# Patient Record
Sex: FEMALE | Race: WHITE | NOT HISPANIC OR LATINO | Employment: OTHER | URBAN - METROPOLITAN AREA
[De-identification: names, ages, dates, MRNs, and addresses within clinical notes are randomized per-mention and may not be internally consistent; named-entity substitution may affect disease eponyms.]

---

## 2021-04-13 DIAGNOSIS — Z23 ENCOUNTER FOR IMMUNIZATION: ICD-10-CM

## 2024-07-29 ENCOUNTER — HOSPITAL ENCOUNTER (INPATIENT)
Facility: HOSPITAL | Age: 69
LOS: 2 days | Discharge: HOME | DRG: 683 | End: 2024-08-01
Attending: EMERGENCY MEDICINE | Admitting: INTERNAL MEDICINE
Payer: COMMERCIAL

## 2024-07-29 DIAGNOSIS — D50.9 IRON DEFICIENCY ANEMIA: ICD-10-CM

## 2024-07-29 DIAGNOSIS — N17.9 AKI (ACUTE KIDNEY INJURY) (HCC): Primary | ICD-10-CM

## 2024-07-29 DIAGNOSIS — I10 ESSENTIAL HYPERTENSION: ICD-10-CM

## 2024-07-29 DIAGNOSIS — E11.9 TYPE 2 DIABETES MELLITUS (HCC): ICD-10-CM

## 2024-07-29 DIAGNOSIS — D64.9 ANEMIA: ICD-10-CM

## 2024-07-29 LAB
ALBUMIN SERPL BCG-MCNC: 3.9 G/DL (ref 3.5–5)
ALP SERPL-CCNC: 42 U/L (ref 34–104)
ALT SERPL W P-5'-P-CCNC: 31 U/L (ref 7–52)
ANION GAP SERPL CALCULATED.3IONS-SCNC: 13 MMOL/L (ref 4–13)
APTT PPP: 33 SECONDS (ref 23–37)
AST SERPL W P-5'-P-CCNC: 41 U/L (ref 13–39)
BASOPHILS # BLD AUTO: 0.07 THOUSANDS/ÂΜL (ref 0–0.1)
BASOPHILS NFR BLD AUTO: 1 % (ref 0–1)
BILIRUB SERPL-MCNC: 0.77 MG/DL (ref 0.2–1)
BUN SERPL-MCNC: 104 MG/DL (ref 5–25)
CALCIUM SERPL-MCNC: 9.5 MG/DL (ref 8.4–10.2)
CARDIAC TROPONIN I PNL SERPL HS: 15 NG/L
CHLORIDE SERPL-SCNC: 96 MMOL/L (ref 96–108)
CO2 SERPL-SCNC: 23 MMOL/L (ref 21–32)
CREAT SERPL-MCNC: 3.73 MG/DL (ref 0.6–1.3)
EOSINOPHIL # BLD AUTO: 0.31 THOUSAND/ÂΜL (ref 0–0.61)
EOSINOPHIL NFR BLD AUTO: 4 % (ref 0–6)
ERYTHROCYTE [DISTWIDTH] IN BLOOD BY AUTOMATED COUNT: 19.2 % (ref 11.6–15.1)
GFR SERPL CREATININE-BSD FRML MDRD: 11 ML/MIN/1.73SQ M
GLUCOSE SERPL-MCNC: 135 MG/DL (ref 65–140)
HCT VFR BLD AUTO: 33 % (ref 34.8–46.1)
HGB BLD-MCNC: 9.8 G/DL (ref 11.5–15.4)
IMM GRANULOCYTES # BLD AUTO: 0.03 THOUSAND/UL (ref 0–0.2)
IMM GRANULOCYTES NFR BLD AUTO: 0 % (ref 0–2)
INR PPP: 1.22 (ref 0.84–1.19)
LYMPHOCYTES # BLD AUTO: 2.26 THOUSANDS/ÂΜL (ref 0.6–4.47)
LYMPHOCYTES NFR BLD AUTO: 31 % (ref 14–44)
MCH RBC QN AUTO: 24.1 PG (ref 26.8–34.3)
MCHC RBC AUTO-ENTMCNC: 29.7 G/DL (ref 31.4–37.4)
MCV RBC AUTO: 81 FL (ref 82–98)
MONOCYTES # BLD AUTO: 0.66 THOUSAND/ÂΜL (ref 0.17–1.22)
MONOCYTES NFR BLD AUTO: 9 % (ref 4–12)
NEUTROPHILS # BLD AUTO: 4.07 THOUSANDS/ÂΜL (ref 1.85–7.62)
NEUTS SEG NFR BLD AUTO: 55 % (ref 43–75)
NRBC BLD AUTO-RTO: 0 /100 WBCS
PLATELET # BLD AUTO: 270 THOUSANDS/UL (ref 149–390)
PMV BLD AUTO: 10.9 FL (ref 8.9–12.7)
POTASSIUM SERPL-SCNC: 3.3 MMOL/L (ref 3.5–5.3)
PROT SERPL-MCNC: 8 G/DL (ref 6.4–8.4)
PROTHROMBIN TIME: 15.9 SECONDS (ref 11.6–14.5)
RBC # BLD AUTO: 4.07 MILLION/UL (ref 3.81–5.12)
SODIUM SERPL-SCNC: 132 MMOL/L (ref 135–147)
WBC # BLD AUTO: 7.4 THOUSAND/UL (ref 4.31–10.16)

## 2024-07-29 PROCEDURE — 80053 COMPREHEN METABOLIC PANEL: CPT | Performed by: EMERGENCY MEDICINE

## 2024-07-29 PROCEDURE — 93005 ELECTROCARDIOGRAM TRACING: CPT

## 2024-07-29 PROCEDURE — 86850 RBC ANTIBODY SCREEN: CPT | Performed by: EMERGENCY MEDICINE

## 2024-07-29 PROCEDURE — 84484 ASSAY OF TROPONIN QUANT: CPT | Performed by: EMERGENCY MEDICINE

## 2024-07-29 PROCEDURE — 86901 BLOOD TYPING SEROLOGIC RH(D): CPT | Performed by: EMERGENCY MEDICINE

## 2024-07-29 PROCEDURE — 82550 ASSAY OF CK (CPK): CPT | Performed by: EMERGENCY MEDICINE

## 2024-07-29 PROCEDURE — 96361 HYDRATE IV INFUSION ADD-ON: CPT

## 2024-07-29 PROCEDURE — 99285 EMERGENCY DEPT VISIT HI MDM: CPT | Performed by: EMERGENCY MEDICINE

## 2024-07-29 PROCEDURE — 83036 HEMOGLOBIN GLYCOSYLATED A1C: CPT | Performed by: INTERNAL MEDICINE

## 2024-07-29 PROCEDURE — 85610 PROTHROMBIN TIME: CPT | Performed by: EMERGENCY MEDICINE

## 2024-07-29 PROCEDURE — 99284 EMERGENCY DEPT VISIT MOD MDM: CPT

## 2024-07-29 PROCEDURE — 85025 COMPLETE CBC W/AUTO DIFF WBC: CPT | Performed by: EMERGENCY MEDICINE

## 2024-07-29 PROCEDURE — 85730 THROMBOPLASTIN TIME PARTIAL: CPT | Performed by: EMERGENCY MEDICINE

## 2024-07-29 PROCEDURE — 96374 THER/PROPH/DIAG INJ IV PUSH: CPT

## 2024-07-29 PROCEDURE — 36415 COLL VENOUS BLD VENIPUNCTURE: CPT | Performed by: EMERGENCY MEDICINE

## 2024-07-29 PROCEDURE — 86900 BLOOD TYPING SEROLOGIC ABO: CPT | Performed by: EMERGENCY MEDICINE

## 2024-07-29 PROCEDURE — 1124F ACP DISCUSS-NO DSCNMKR DOCD: CPT | Performed by: EMERGENCY MEDICINE

## 2024-07-29 RX ORDER — PANTOPRAZOLE SODIUM 40 MG/10ML
40 INJECTION, POWDER, LYOPHILIZED, FOR SOLUTION INTRAVENOUS ONCE
Status: COMPLETED | OUTPATIENT
Start: 2024-07-29 | End: 2024-07-29

## 2024-07-29 RX ADMIN — PANTOPRAZOLE SODIUM 40 MG: 40 INJECTION, POWDER, FOR SOLUTION INTRAVENOUS at 22:57

## 2024-07-29 RX ADMIN — SODIUM CHLORIDE 1000 ML: 0.9 INJECTION, SOLUTION INTRAVENOUS at 22:57

## 2024-07-30 ENCOUNTER — APPOINTMENT (INPATIENT)
Dept: RADIOLOGY | Facility: HOSPITAL | Age: 69
DRG: 683 | End: 2024-07-30
Payer: COMMERCIAL

## 2024-07-30 ENCOUNTER — APPOINTMENT (INPATIENT)
Dept: NON INVASIVE DIAGNOSTICS | Facility: HOSPITAL | Age: 69
DRG: 683 | End: 2024-07-30
Payer: COMMERCIAL

## 2024-07-30 PROBLEM — I10 ESSENTIAL HYPERTENSION: Status: ACTIVE | Noted: 2024-07-30

## 2024-07-30 PROBLEM — E66.9 OBESITY (BMI 30-39.9): Status: ACTIVE | Noted: 2024-07-30

## 2024-07-30 PROBLEM — D64.9 ANEMIA: Status: ACTIVE | Noted: 2024-07-30

## 2024-07-30 PROBLEM — Z87.19 H/O: UPPER GI BLEED: Status: ACTIVE | Noted: 2024-07-30

## 2024-07-30 PROBLEM — I50.32 CHRONIC DIASTOLIC CONGESTIVE HEART FAILURE (HCC): Status: ACTIVE | Noted: 2024-07-30

## 2024-07-30 PROBLEM — N17.9 AKI (ACUTE KIDNEY INJURY) (HCC): Status: ACTIVE | Noted: 2024-07-30

## 2024-07-30 PROBLEM — E11.9 TYPE 2 DIABETES MELLITUS (HCC): Status: ACTIVE | Noted: 2024-07-30

## 2024-07-30 LAB
2HR DELTA HS TROPONIN: 0 NG/L
ABO GROUP BLD: NORMAL
ANION GAP SERPL CALCULATED.3IONS-SCNC: 10 MMOL/L (ref 4–13)
AORTIC ROOT: 2.9 CM
APICAL FOUR CHAMBER EJECTION FRACTION: 77 %
ASCENDING AORTA: 3.2 CM
ATRIAL RATE: 82 BPM
ATRIAL RATE: 88 BPM
BACTERIA UR QL AUTO: ABNORMAL /HPF
BASOPHILS # BLD AUTO: 0.05 THOUSANDS/ÂΜL (ref 0–0.1)
BASOPHILS NFR BLD AUTO: 1 % (ref 0–1)
BILIRUB UR QL STRIP: NEGATIVE
BLD GP AB SCN SERPL QL: NEGATIVE
BSA FOR ECHO PROCEDURE: 2.04 M2
BUN SERPL-MCNC: 106 MG/DL (ref 5–25)
CALCIUM SERPL-MCNC: 8.8 MG/DL (ref 8.4–10.2)
CARDIAC TROPONIN I PNL SERPL HS: 15 NG/L
CHLORIDE SERPL-SCNC: 102 MMOL/L (ref 96–108)
CHOLEST SERPL-MCNC: 67 MG/DL
CK SERPL-CCNC: 264 U/L (ref 26–192)
CLARITY UR: CLEAR
CO2 SERPL-SCNC: 23 MMOL/L (ref 21–32)
COLOR UR: YELLOW
CREAT SERPL-MCNC: 3.15 MG/DL (ref 0.6–1.3)
DOP CALC LVOT AREA: 3.14 CM2
DOP CALC LVOT DIAMETER: 2 CM
E WAVE DECELERATION TIME: 454 MS
E/A RATIO: 0.72
EOSINOPHIL # BLD AUTO: 0.29 THOUSAND/ÂΜL (ref 0–0.61)
EOSINOPHIL NFR BLD AUTO: 5 % (ref 0–6)
ERYTHROCYTE [DISTWIDTH] IN BLOOD BY AUTOMATED COUNT: 19.2 % (ref 11.6–15.1)
EST. AVERAGE GLUCOSE BLD GHB EST-MCNC: 169 MG/DL
FRACTIONAL SHORTENING: 32 (ref 28–44)
GFR SERPL CREATININE-BSD FRML MDRD: 14 ML/MIN/1.73SQ M
GLUCOSE SERPL-MCNC: 124 MG/DL (ref 65–140)
GLUCOSE SERPL-MCNC: 130 MG/DL (ref 65–140)
GLUCOSE SERPL-MCNC: 138 MG/DL (ref 65–140)
GLUCOSE SERPL-MCNC: 139 MG/DL (ref 65–140)
GLUCOSE SERPL-MCNC: 150 MG/DL (ref 65–140)
GLUCOSE UR STRIP-MCNC: NEGATIVE MG/DL
HBA1C MFR BLD: 7.5 %
HCT VFR BLD AUTO: 27.9 % (ref 34.8–46.1)
HDLC SERPL-MCNC: 20 MG/DL
HGB BLD-MCNC: 8.5 G/DL (ref 11.5–15.4)
HGB UR QL STRIP.AUTO: NEGATIVE
HYALINE CASTS #/AREA URNS LPF: ABNORMAL /LPF
IMM GRANULOCYTES # BLD AUTO: 0.02 THOUSAND/UL (ref 0–0.2)
IMM GRANULOCYTES NFR BLD AUTO: 0 % (ref 0–2)
INTERVENTRICULAR SEPTUM IN DIASTOLE (PARASTERNAL SHORT AXIS VIEW): 1.1 CM
INTERVENTRICULAR SEPTUM: 1.1 CM (ref 0.6–1.1)
KETONES UR STRIP-MCNC: NEGATIVE MG/DL
LAAS-AP2: 17.4 CM2
LAAS-AP4: 17.7 CM2
LDLC SERPL CALC-MCNC: 29 MG/DL (ref 0–100)
LEFT ATRIUM AREA SYSTOLE SINGLE PLANE A4C: 15.9 CM2
LEFT ATRIUM SIZE: 3.5 CM
LEFT ATRIUM VOLUME (MOD BIPLANE): 56 ML
LEFT ATRIUM VOLUME INDEX (MOD BIPLANE): 27.5 ML/M2
LEFT INTERNAL DIMENSION IN SYSTOLE: 3 CM (ref 2.1–4)
LEFT VENTRICULAR INTERNAL DIMENSION IN DIASTOLE: 4.4 CM (ref 3.5–6)
LEFT VENTRICULAR POSTERIOR WALL IN END DIASTOLE: 1.1 CM
LEFT VENTRICULAR STROKE VOLUME: 54 ML
LEUKOCYTE ESTERASE UR QL STRIP: ABNORMAL
LVSV (TEICH): 54 ML
LYMPHOCYTES # BLD AUTO: 2.36 THOUSANDS/ÂΜL (ref 0.6–4.47)
LYMPHOCYTES NFR BLD AUTO: 39 % (ref 14–44)
MCH RBC QN AUTO: 24.5 PG (ref 26.8–34.3)
MCHC RBC AUTO-ENTMCNC: 30.5 G/DL (ref 31.4–37.4)
MCV RBC AUTO: 80 FL (ref 82–98)
MONOCYTES # BLD AUTO: 0.66 THOUSAND/ÂΜL (ref 0.17–1.22)
MONOCYTES NFR BLD AUTO: 11 % (ref 4–12)
MV E'TISSUE VEL-SEP: 6 CM/S
MV PEAK A VEL: 0.82 M/S
MV PEAK E VEL: 59 CM/S
MV STENOSIS PRESSURE HALF TIME: 132 MS
MV VALVE AREA P 1/2 METHOD: 1.67
NEUTROPHILS # BLD AUTO: 2.67 THOUSANDS/ÂΜL (ref 1.85–7.62)
NEUTS SEG NFR BLD AUTO: 44 % (ref 43–75)
NITRITE UR QL STRIP: NEGATIVE
NON-SQ EPI CELLS URNS QL MICRO: ABNORMAL /HPF
NRBC BLD AUTO-RTO: 0 /100 WBCS
OTHER STN SPEC: ABNORMAL
P AXIS: 64 DEGREES
P AXIS: 66 DEGREES
PH UR STRIP.AUTO: 5 [PH]
PLATELET # BLD AUTO: 210 THOUSANDS/UL (ref 149–390)
PMV BLD AUTO: 10.5 FL (ref 8.9–12.7)
POTASSIUM SERPL-SCNC: 3.2 MMOL/L (ref 3.5–5.3)
PR INTERVAL: 134 MS
PR INTERVAL: 142 MS
PROT UR STRIP-MCNC: NEGATIVE MG/DL
QRS AXIS: 58 DEGREES
QRS AXIS: 63 DEGREES
QRSD INTERVAL: 88 MS
QRSD INTERVAL: 98 MS
QT INTERVAL: 428 MS
QT INTERVAL: 470 MS
QTC INTERVAL: 517 MS
QTC INTERVAL: 549 MS
RA PRESSURE ESTIMATED: 3 MMHG
RBC # BLD AUTO: 3.47 MILLION/UL (ref 3.81–5.12)
RBC #/AREA URNS AUTO: ABNORMAL /HPF
RH BLD: POSITIVE
RIGHT ATRIUM AREA SYSTOLE A4C: 15.6 CM2
RIGHT VENTRICLE ID DIMENSION: 3.8 CM
RV PSP: 25 MMHG
SL CV LEFT ATRIUM LENGTH A2C: 5 CM
SL CV LV EF: 75
SL CV PED ECHO LEFT VENTRICLE DIASTOLIC VOLUME (MOD BIPLANE) 2D: 89 ML
SL CV PED ECHO LEFT VENTRICLE SYSTOLIC VOLUME (MOD BIPLANE) 2D: 36 ML
SL CV TR MAX PG ANTEGRADE: 20 MMHG
SODIUM SERPL-SCNC: 135 MMOL/L (ref 135–147)
SP GR UR STRIP.AUTO: 1.01 (ref 1–1.03)
SPECIMEN EXPIRATION DATE: NORMAL
T WAVE AXIS: 66 DEGREES
T WAVE AXIS: 72 DEGREES
TR MAX PG: 22 MMHG
TR PEAK VELOCITY: 2.3 M/S
TRICUSPID ANNULAR PLANE SYSTOLIC EXCURSION: 2.6 CM
TRICUSPID VALVE PEAK REGURGITATION VELOCITY: 2.32 M/S
TRIGL SERPL-MCNC: 91 MG/DL
TV MEAN GRADIENT: 14 MMHG
TV MV D: 1.82 M/S
TV VTI: 61.75 CM
UROBILINOGEN UR STRIP-ACNC: <2 MG/DL
VENTRICULAR RATE: 82 BPM
VENTRICULAR RATE: 88 BPM
WBC # BLD AUTO: 6.05 THOUSAND/UL (ref 4.31–10.16)
WBC #/AREA URNS AUTO: ABNORMAL /HPF

## 2024-07-30 PROCEDURE — 93306 TTE W/DOPPLER COMPLETE: CPT

## 2024-07-30 PROCEDURE — 85025 COMPLETE CBC W/AUTO DIFF WBC: CPT | Performed by: INTERNAL MEDICINE

## 2024-07-30 PROCEDURE — 93010 ELECTROCARDIOGRAM REPORT: CPT | Performed by: INTERNAL MEDICINE

## 2024-07-30 PROCEDURE — 81001 URINALYSIS AUTO W/SCOPE: CPT | Performed by: INTERNAL MEDICINE

## 2024-07-30 PROCEDURE — 93306 TTE W/DOPPLER COMPLETE: CPT | Performed by: INTERNAL MEDICINE

## 2024-07-30 PROCEDURE — 82948 REAGENT STRIP/BLOOD GLUCOSE: CPT

## 2024-07-30 PROCEDURE — 80048 BASIC METABOLIC PNL TOTAL CA: CPT | Performed by: INTERNAL MEDICINE

## 2024-07-30 PROCEDURE — 87077 CULTURE AEROBIC IDENTIFY: CPT | Performed by: INTERNAL MEDICINE

## 2024-07-30 PROCEDURE — 87205 SMEAR GRAM STAIN: CPT | Performed by: INTERNAL MEDICINE

## 2024-07-30 PROCEDURE — 99223 1ST HOSP IP/OBS HIGH 75: CPT | Performed by: INTERNAL MEDICINE

## 2024-07-30 PROCEDURE — 71045 X-RAY EXAM CHEST 1 VIEW: CPT

## 2024-07-30 PROCEDURE — 87086 URINE CULTURE/COLONY COUNT: CPT | Performed by: INTERNAL MEDICINE

## 2024-07-30 PROCEDURE — 87186 SC STD MICRODIL/AGAR DIL: CPT | Performed by: INTERNAL MEDICINE

## 2024-07-30 PROCEDURE — 80061 LIPID PANEL: CPT | Performed by: INTERNAL MEDICINE

## 2024-07-30 PROCEDURE — 84484 ASSAY OF TROPONIN QUANT: CPT | Performed by: EMERGENCY MEDICINE

## 2024-07-30 RX ORDER — INSULIN LISPRO 100 [IU]/ML
1-6 INJECTION, SOLUTION INTRAVENOUS; SUBCUTANEOUS
Status: DISCONTINUED | OUTPATIENT
Start: 2024-07-30 | End: 2024-08-01 | Stop reason: HOSPADM

## 2024-07-30 RX ORDER — PANTOPRAZOLE SODIUM 40 MG/1
40 TABLET, DELAYED RELEASE ORAL 2 TIMES DAILY
COMMUNITY

## 2024-07-30 RX ORDER — ATORVASTATIN CALCIUM 20 MG/1
20 TABLET, FILM COATED ORAL DAILY
COMMUNITY

## 2024-07-30 RX ORDER — POTASSIUM CHLORIDE 20 MEQ/1
40 TABLET, EXTENDED RELEASE ORAL ONCE
Status: COMPLETED | OUTPATIENT
Start: 2024-07-30 | End: 2024-07-30

## 2024-07-30 RX ORDER — ALBUTEROL SULFATE 90 UG/1
2 AEROSOL, METERED RESPIRATORY (INHALATION) EVERY 6 HOURS PRN
COMMUNITY

## 2024-07-30 RX ORDER — SODIUM CHLORIDE 9 MG/ML
100 INJECTION, SOLUTION INTRAVENOUS CONTINUOUS
Status: DISCONTINUED | OUTPATIENT
Start: 2024-07-30 | End: 2024-07-31

## 2024-07-30 RX ORDER — ACETAMINOPHEN 325 MG/1
650 TABLET ORAL EVERY 6 HOURS PRN
Status: DISCONTINUED | OUTPATIENT
Start: 2024-07-30 | End: 2024-08-01 | Stop reason: HOSPADM

## 2024-07-30 RX ORDER — ATORVASTATIN CALCIUM 20 MG/1
20 TABLET, FILM COATED ORAL
Status: DISCONTINUED | OUTPATIENT
Start: 2024-07-30 | End: 2024-08-01 | Stop reason: HOSPADM

## 2024-07-30 RX ORDER — ALBUTEROL SULFATE 90 UG/1
2 AEROSOL, METERED RESPIRATORY (INHALATION) EVERY 6 HOURS PRN
Status: DISCONTINUED | OUTPATIENT
Start: 2024-07-30 | End: 2024-08-01 | Stop reason: HOSPADM

## 2024-07-30 RX ORDER — HEPARIN SODIUM 5000 [USP'U]/ML
5000 INJECTION, SOLUTION INTRAVENOUS; SUBCUTANEOUS EVERY 8 HOURS SCHEDULED
Status: DISCONTINUED | OUTPATIENT
Start: 2024-07-30 | End: 2024-08-01 | Stop reason: HOSPADM

## 2024-07-30 RX ORDER — VALSARTAN 320 MG/1
320 TABLET ORAL DAILY
Status: ON HOLD | COMMUNITY
End: 2024-08-01

## 2024-07-30 RX ORDER — LIDOCAINE 50 MG/G
1 PATCH TOPICAL DAILY
Status: DISCONTINUED | OUTPATIENT
Start: 2024-07-30 | End: 2024-08-01 | Stop reason: HOSPADM

## 2024-07-30 RX ORDER — PANTOPRAZOLE SODIUM 40 MG/1
40 TABLET, DELAYED RELEASE ORAL
Status: DISCONTINUED | OUTPATIENT
Start: 2024-07-30 | End: 2024-08-01 | Stop reason: HOSPADM

## 2024-07-30 RX ORDER — HYDROCHLOROTHIAZIDE 25 MG/1
25 TABLET ORAL DAILY
Status: ON HOLD | COMMUNITY
End: 2024-08-01

## 2024-07-30 RX ORDER — FUROSEMIDE 40 MG/1
40 TABLET ORAL DAILY
Status: ON HOLD | COMMUNITY
End: 2024-08-01

## 2024-07-30 RX ADMIN — HEPARIN SODIUM 5000 UNITS: 5000 INJECTION, SOLUTION INTRAVENOUS; SUBCUTANEOUS at 13:34

## 2024-07-30 RX ADMIN — PANTOPRAZOLE SODIUM 40 MG: 40 TABLET, DELAYED RELEASE ORAL at 05:32

## 2024-07-30 RX ADMIN — LIDOCAINE 5% 1 PATCH: 700 PATCH TOPICAL at 08:28

## 2024-07-30 RX ADMIN — ATORVASTATIN CALCIUM 20 MG: 20 TABLET, FILM COATED ORAL at 16:55

## 2024-07-30 RX ADMIN — HEPARIN SODIUM 5000 UNITS: 5000 INJECTION, SOLUTION INTRAVENOUS; SUBCUTANEOUS at 05:14

## 2024-07-30 RX ADMIN — POTASSIUM CHLORIDE 40 MEQ: 1500 TABLET, EXTENDED RELEASE ORAL at 00:09

## 2024-07-30 RX ADMIN — SODIUM CHLORIDE 75 ML/HR: 0.9 INJECTION, SOLUTION INTRAVENOUS at 13:44

## 2024-07-30 RX ADMIN — HEPARIN SODIUM 5000 UNITS: 5000 INJECTION, SOLUTION INTRAVENOUS; SUBCUTANEOUS at 21:31

## 2024-07-30 RX ADMIN — POTASSIUM CHLORIDE 40 MEQ: 1500 TABLET, EXTENDED RELEASE ORAL at 13:34

## 2024-07-30 RX ADMIN — PANTOPRAZOLE SODIUM 40 MG: 40 TABLET, DELAYED RELEASE ORAL at 16:55

## 2024-07-30 RX ADMIN — SODIUM CHLORIDE 75 ML/HR: 0.9 INJECTION, SOLUTION INTRAVENOUS at 01:20

## 2024-07-30 RX ADMIN — UMECLIDINIUM BROMIDE AND VILANTEROL TRIFENATATE 1 PUFF: 62.5; 25 POWDER RESPIRATORY (INHALATION) at 08:38

## 2024-07-30 RX ADMIN — INSULIN LISPRO 1 UNITS: 100 INJECTION, SOLUTION INTRAVENOUS; SUBCUTANEOUS at 21:30

## 2024-07-30 NOTE — ASSESSMENT & PLAN NOTE
Hg 9.8   Recent upper GI bleed 3 weeks ago. Received 5 units PRBC at that time   Continue to monitor and transfuse if less than 7

## 2024-07-30 NOTE — ASSESSMENT & PLAN NOTE
Home regimen:   Valsartan 320 mg daily  HCTZ 25 mg daily  Lasix 40 mg daily  Per daughter BP low at home.  She held BP medications over the past 2 days.  Normotensive here thus far.    Continue to hold medications due to TRELL

## 2024-07-30 NOTE — ASSESSMENT & PLAN NOTE
Wt Readings from Last 3 Encounters:   07/29/24 96.2 kg (212 lb)     Appears hypovolemic on exam  Per daughter bilateral lower extremity swelling has improved significantly over the past 3 weeks with patient's dietary changes.  Continues to eat 3 meals per day but has cut out lots of sodium and high sugary foods  Hold home Lasix and HCTZ  I/O and daily weights

## 2024-07-30 NOTE — ASSESSMENT & PLAN NOTE
Presents from home with daughter due to feeling lightheaded and hypotension per home readings  Recent admission at [facility] (3 weeks ago) due to upper GI bleed/Gastric ulcer.  Bands placed per daughter.  Received approximately 5 units PRBC  Creatinine 3.73 (1.3 on 7/15)  2.36 was peak at [facility]   Urine sample pending  Urinary retention protocol   Appears hypovolemic on exam   Received 1 liter bolus in the ER   Order gentle IV fluids   Hold home valsartan, Lasix, HCTZ  Recheck Cr in the am.   Nephrology consult

## 2024-07-30 NOTE — ED PROVIDER NOTES
History  Chief Complaint   Patient presents with   • Dizziness     Pt presents with c/o feeling lightheaded since waking up with hypotension. Recently admitted for GI bleed 3 weeks ago.      68-year-old female with history of CAD COPD diabetes recent upper GI bleed with banded gastric ulcer thought to be due to NSAID use while visiting her daughters at [facility] presents for evaluation of hypotension and lightheadedness, per the daughter who is providing ancillary history after the banding the patient did initially feel better, she received 5 units of packed red cells 3 weeks ago when her hemoglobin was found to be 4.9 during the admission.  Then has been feeling better, her blood pressure medications were restarted however the daughter held them over the last 2 days as her pressures again trended lower, she has been taking her Protonix 40 mg twice daily, has not had any melena or hematochezia has not had any abdominal pain but overall has been feeling more lightheaded.  She is not on any blood thinners or NSAIDs        None       Past Medical History:   Diagnosis Date   • CAD (coronary artery disease)    • COPD (chronic obstructive pulmonary disease) (HCC)    • Diabetes mellitus (HCC)    • Diastolic heart failure (HCC)    • GI bleed    • Hiatal hernia        Past Surgical History:   Procedure Laterality Date   •  SECTION         History reviewed. No pertinent family history.  I have reviewed and agree with the history as documented.    E-Cigarette/Vaping     E-Cigarette/Vaping Substances   • Nicotine No    • THC No    • CBD No    • Flavoring No    • Other No    • Unknown No      Social History     Tobacco Use   • Smoking status: Former     Types: Cigarettes     Start date:      Quit date: 1980     Years since quittin.6   • Smokeless tobacco: Never   Substance Use Topics   • Alcohol use: Not Currently   • Drug use: Never       Review of Systems   Constitutional:  Negative for appetite  change and fever.   HENT:  Negative for rhinorrhea and sore throat.    Eyes:  Negative for photophobia and visual disturbance.   Respiratory:  Negative for cough, chest tightness and wheezing.    Cardiovascular:  Negative for chest pain, palpitations and leg swelling.   Gastrointestinal:  Negative for abdominal distention, abdominal pain, blood in stool, constipation and diarrhea.   Genitourinary:  Negative for dysuria, flank pain, frequency, hematuria and urgency.   Musculoskeletal:  Negative for back pain.   Skin:  Negative for rash.   Neurological:  Positive for light-headedness. Negative for dizziness, weakness and headaches.   All other systems reviewed and are negative.      Physical Exam  Physical Exam  Vitals and nursing note reviewed.   Constitutional:       Appearance: She is well-developed.   HENT:      Head: Normocephalic and atraumatic.   Eyes:      Pupils: Pupils are equal, round, and reactive to light.   Cardiovascular:      Rate and Rhythm: Normal rate and regular rhythm.      Heart sounds: No murmur heard.     No friction rub. No gallop.   Pulmonary:      Effort: Pulmonary effort is normal.      Breath sounds: No wheezing or rales.   Chest:      Chest wall: No tenderness.   Abdominal:      General: There is no distension.      Palpations: Abdomen is soft. There is no mass.      Tenderness: There is no abdominal tenderness. There is no guarding or rebound.   Musculoskeletal:      Cervical back: Normal range of motion and neck supple.   Skin:     General: Skin is warm and dry.      Capillary Refill: Capillary refill takes less than 2 seconds.   Neurological:      Mental Status: She is alert and oriented to person, place, and time.         Vital Signs  ED Triage Vitals   Temperature Pulse Respirations Blood Pressure SpO2   07/29/24 2224 07/29/24 2224 07/29/24 2224 07/29/24 2224 07/29/24 2224   99.2 °F (37.3 °C) 95 18 100/65 94 %      Temp Source Heart Rate Source Patient Position - Orthostatic VS BP  Location FiO2 (%)   07/29/24 2224 07/29/24 2224 07/29/24 2245 07/29/24 2245 --   Temporal Monitor Sitting Right arm       Pain Score       --                  Vitals:    07/29/24 2224 07/29/24 2245   BP: 100/65 114/55   Pulse: 95 82   Patient Position - Orthostatic VS:  Sitting         Visual Acuity      ED Medications  Medications   sodium chloride 0.9 % bolus 1,000 mL (1,000 mL Intravenous New Bag 7/29/24 2257)   pantoprazole (PROTONIX) injection 40 mg (40 mg Intravenous Given 7/29/24 2257)       Diagnostic Studies  Results Reviewed       Procedure Component Value Units Date/Time    CBC and differential [[id number]] Collected: 07/29/24 2259    Lab Status: No result Specimen: Blood from Arm, Left     Comprehensive metabolic panel [[id number]] Collected: 07/29/24 2259    Lab Status: No result Specimen: Blood from Arm, Left     HS Troponin 0hr (reflex protocol) [[id number]] Collected: 07/29/24 2259    Lab Status: No result Specimen: Blood from Arm, Left     Protime-INR [[id number]] Collected: 07/29/24 2259    Lab Status: No result Specimen: Blood from Arm, Left     APTT [[id number]] Collected: 07/29/24 2259    Lab Status: No result Specimen: Blood from Arm, Left                    No orders to display              Procedures  Procedures         ED Course  ED Course as of 07/30/24 0251 Mon Jul 29, 2024 2236 Procedure Note: EKG  Date/Time: 07/29/24 10:36 PM   Performed by: MARIMAR CAMEJO  Authorized by: MARIMAR CAMEJO  Indications / Diagnosis: Generalized weakness  ECG reviewed by me, the ED Provider: yes   The EKG demonstrates:  Rhythm: normal sinus  Intervals: normal intervals  Axis: normal axis  QRS/Blocks: normal QRS  ST Changes: No acute ST Changes, no STD/SHIRLEY.    PVCs     2323 Per daughter repeat blood work after transfusion hemoglobin was 10.2, today hemoglobin 9.8 appears stable   2357 Creatinine(!): 3.73  July 15th , was 1.3                                 SBIRT 22yo+      Flowsheet Row Most Recent Value   Initial  Alcohol Screen: US AUDIT-C     1. How often do you have a drink containing alcohol? 0 Filed at: 07/29/2024 2228   2. How many drinks containing alcohol do you have on a typical day you are drinking?  0 Filed at: 07/29/2024 2228   3a. Male UNDER 65: How often do you have five or more drinks on one occasion? 0 Filed at: 07/29/2024 2228   3b. FEMALE Any Age, or MALE 65+: How often do you have 4 or more drinks on one occassion? 0 Filed at: 07/29/2024 2228   Audit-C Score 0 Filed at: 07/29/2024 2228   SETH: How many times in the past year have you...    Used an illegal drug or used a prescription medication for non-medical reasons? Never Filed at: 07/29/2024 2228                      Medical Decision Making  68-year-old female with lightheadedness, transient hypotension at home with pressures in low 90s at home concern for recurrent GI bleeding EKG to evaluate for arrhythmia lab work to evaluate for electrolyte abnormalities, dehydration, anemia currently blood pressures are stable patient is in no distress and has no chest pain or trouble breathing    Amount and/or Complexity of Data Reviewed  Labs: ordered.    Risk  Prescription drug management.                 Disposition  Final diagnoses:   None     ED Disposition       None          Follow-up Information    None         Patient's Medications    No medications on file       No discharge procedures on file.    PDMP Review       None            ED Provider  Electronically Signed by             Maryjane Bergeron DO  07/30/24 0254

## 2024-07-30 NOTE — CONSULTS
Consultation - Nephrology   Zelda Galaviz 68 y.o. female MRN: [id number]  Unit/Bed#: -01 Encounter: [id number]    ASSESSMENT/PLAN:    TRELL (POA)  -Baseline creatinine: 0.7 from 7/2022.  Per notes had TRELL during recent hospitalization at outside facility with creatinine peaking at 2.3.  Per daughter creatinine at time of recent discharge was 1.3 and then had a recheck on 7/15 and was 1.3 at that time  -Creatinine on admission 3.7, most recent creatinine 3.1  -Etiology: Prerenal azotemia/hypovolemia, hypotension  -UA: Moderate leukocytes, no protein, no RBCs, 10-20 WBCs, moderate bacteria, 2-4 hyaline casts  -Received IV fluid resuscitation on admission, currently on 0.9% saline at 75 mL/h, creatinine is improving  -Continue holding Lasix, valsartan, HCTZ.  Might need adjustment of diuretic regimen  -Azotemic with a BUN of 106  -Urinary retention protocol    Hypertension/blood pressure  -OP regimen: Lasix 40 mg daily, HCTZ 25 mg daily, valsartan 320 mg daily  -Current regimen: No antihypertensives  -Recent blood pressures are somewhat labile but maps not less than 65    Anemia/history of recent GI bleed  -Hgb 8.5  -Recommend transfuse for goal greater than 7 per primary team  -Per notes had a recent admission at  outside facility for GI bleeding received blood products    HFpEF  -TTE is pending  -Diuretics as above which are currently held  -Per patient she has made changes in her diet and noticed lower extremity edema has massively improved, she appears hypovolemic on exam, she might require adjustment in her diuretic regimen  -She follows closely with cardiology as outpatient    Additional Medical Problems: Morbid obesity, history of upper GI bleed    HISTORY OF PRESENT ILLNESS:  Requesting Physician: Darnell Carr MD  Reason for Consult: TRELL Galaviz is a 68 y.o. year old female who was admitted to [facility] after presenting with low blood pressures, lightheadedness over the past  several days.  She reports her lower extremity edema is massively improved from prior, she denies any NSAID use reports her blood pressures while at home have ranged systolic from 90s to 110s to 120 range with some episodes of lightheadedness.  She denies any nausea/vomiting/diarrhea. A renal consultation is requested today for assistance in the management of TRELL.    PAST MEDICAL HISTORY:  Past Medical History:   Diagnosis Date    CAD (coronary artery disease)     COPD (chronic obstructive pulmonary disease) (HCC)     Diabetes mellitus (HCC)     Diastolic heart failure (HCC)     GI bleed     Hiatal hernia        PAST SURGICAL HISTORY:  Past Surgical History:   Procedure Laterality Date     SECTION         ALLERGIES:  No Known Allergies    SOCIAL HISTORY:  Social History     Substance and Sexual Activity   Alcohol Use Not Currently     Social History     Substance and Sexual Activity   Drug Use Never     Social History     Tobacco Use   Smoking Status Former    Types: Cigarettes    Start date:     Quit date:     Years since quittin.6   Smokeless Tobacco Never       FAMILY HISTORY:  History reviewed. No pertinent family history.    MEDICATIONS:    Current Facility-Administered Medications:     acetaminophen (TYLENOL) tablet 650 mg, 650 mg, Oral, Q6H PRN, Cyn Moran PA-C    albuterol (PROVENTIL HFA,VENTOLIN HFA) inhaler 2 puff, 2 puff, Inhalation, Q6H PRN, Cyn Moran PA-C    atorvastatin (LIPITOR) tablet 20 mg, 20 mg, Oral, Daily With Dinner, Cyn Moran PA-C    heparin (porcine) subcutaneous injection 5,000 Units, 5,000 Units, Subcutaneous, Q8H [facility], Cyn Moran PA-C, 5,000 Units at 24 0514    insulin lispro (HumALOG/ADMELOG) 100 units/mL subcutaneous injection 1-6 Units, 1-6 Units, Subcutaneous, TID AC **AND** Fingerstick Glucose (POCT), , , TID AC, Cyn Moran PA-C    insulin lispro (HumALOG/ADMELOG) 100 units/mL subcutaneous injection 1-6 Units, 1-6 Units, Subcutaneous, HS, Cyn Moran  "ADONAY    lidocaine (LIDODERM) 5 % patch 1 patch, 1 patch, Topical, Daily, Cyn Moran PA-C, 1 patch at 07/30/24 0828    pantoprazole (PROTONIX) EC tablet 40 mg, 40 mg, Oral, BID AC, Cyn Moran PA-C, 40 mg at 07/30/24 0532    sodium chloride 0.9 % infusion, 75 mL/hr, Intravenous, Continuous, Cyn Moran PA-C, Last Rate: 75 mL/hr at 07/30/24 0120, 75 mL/hr at 07/30/24 0120    umeclidinium-vilanterol 62.5-25 mcg/actuation inhaler 1 puff, 1 puff, Inhalation, Daily, Cyn Moran PA-C, 1 puff at 07/30/24 0838    REVIEW OF SYSTEMS:  Review of Systems   Constitutional: Negative.    Respiratory: Negative.     Cardiovascular: Negative.    Gastrointestinal: Negative.    Neurological:  Positive for dizziness and light-headedness.      A complete 10 point review of systems was performed and found to be negative unless otherwise noted above or in the HPI.    PHYSICAL EXAM:  Current Weight: Weight - Scale: 97.9 kg (215 lb 12.8 oz)  First Weight: Weight - Scale: 96.2 kg (212 lb)  Vitals:    07/29/24 2330 07/30/24 0000 07/30/24 0031 07/30/24 0736   BP: 110/53 106/52 112/61 99/53   BP Location: Right arm Right arm Right arm Right arm   Pulse: 78 83 92 75   Resp: 18 18 16    Temp:   98.7 °F (37.1 °C) 97.8 °F (36.6 °C)   TempSrc:   Temporal Oral   SpO2: 94% 95% 97% 94%   Weight:  97.9 kg (215 lb 12.8 oz) 97.9 kg (215 lb 12.8 oz)    Height:   5' 5\" (1.651 m)        Intake/Output Summary (Last 24 hours) at 7/30/2024 1021  Last data filed at 7/30/2024 0844  Gross per 24 hour   Intake 1600 ml   Output 500 ml   Net 1100 ml     Physical Exam  Vitals and nursing note reviewed.   Constitutional:       General: She is not in acute distress.     Appearance: She is obese. She is not toxic-appearing or diaphoretic.      Comments: Awake sitting in chair   HENT:      Head: Normocephalic and atraumatic.      Nose: Nose normal.      Mouth/Throat:      Mouth: Mucous membranes are dry.   Eyes:      General: No scleral icterus.  Cardiovascular:      Rate " and Rhythm: Normal rate.      Pulses: Normal pulses.   Pulmonary:      Effort: Pulmonary effort is normal. No respiratory distress.      Breath sounds: No wheezing or rales.   Abdominal:      General: Abdomen is flat.   Musculoskeletal:      Cervical back: Neck supple.      Right lower leg: No edema.      Left lower leg: No edema.   Skin:     General: Skin is warm and dry.      Capillary Refill: Capillary refill takes less than 2 seconds.   Neurological:      Mental Status: She is alert and oriented to person, place, and time.          Invasive Devices:      Lab Results:   Results from last 7 days   Lab Units 07/30/24  0513 07/29/24  2259   WBC Thousand/uL 6.05 7.40   HEMOGLOBIN g/dL 8.5* 9.8*   HEMATOCRIT % 27.9* 33.0*   PLATELETS Thousands/uL 210 270   POTASSIUM mmol/L 3.2* 3.3*   CHLORIDE mmol/L 102 96   CO2 mmol/L 23 23   BUN mg/dL 106* 104*   CREATININE mg/dL 3.15* 3.73*   CALCIUM mg/dL 8.8 9.5   ALK PHOS U/L  --  42   ALT U/L  --  31   AST U/L  --  41*       I have personally reviewed the blood work as stated above and in my note.  I have personally reviewed chest x-ray imaging studies.  I have personally reviewed internal medicine, ED, outpatient PCP note.

## 2024-07-30 NOTE — H&P
[facility]  H&P  Name: Zelda Galaviz 68 y.o. female I MRN: [id number]  Unit/Bed#: -01 I Date of Admission: 7/29/2024   Date of Service: 7/30/2024 I Hospital Day: 0      Assessment & Plan   * TRELL (acute kidney injury) (HCC)  Assessment & Plan  Presents from home with daughter due to feeling lightheaded and hypotension per home readings  Recent admission at [facility] (3 weeks ago) due to upper GI bleed/Gastric ulcer.  Bands placed per daughter.  Received approximately 5 units PRBC  Creatinine 3.73 (1.3 on 7/15)  2.36 was peak at [facility]   Urine sample pending  Urinary retention protocol   Appears hypovolemic on exam   Received 1 liter bolus in the ER   Order gentle IV fluids   Hold home valsartan, Lasix, HCTZ  Recheck Cr in the am.   Nephrology consult    Anemia  Assessment & Plan  Hg 9.8   Recent upper GI bleed 3 weeks ago. Received 5 units PRBC at that time   Continue to monitor and transfuse if less than 7    H/O: upper GI bleed  Assessment & Plan  Upper GI bleed treated at [facility] approximately 3 weeks ago  Per daughter gastric ulcer felt to be due to NSAID use.   No further NSAID use since discharge  Daughter and patient deny any further bleeding since she was discharged  Continue to monitor    Chronic diastolic congestive heart failure (HCC)  Assessment & Plan  Wt Readings from Last 3 Encounters:   07/29/24 96.2 kg (212 lb)     Appears hypovolemic on exam  Per daughter bilateral lower extremity swelling has improved significantly over the past 3 weeks with patient's dietary changes.  Continues to eat 3 meals per day but has cut out lots of sodium and high sugary foods  Hold home Lasix and HCTZ  I/O and daily weights    Obesity (BMI 30-39.9)  Assessment & Plan  Body mass index is 35.28 kg/m².  Encourage lifestyle changes    Essential hypertension  Assessment & Plan  Home regimen:   Valsartan 320 mg daily  HCTZ 25 mg daily  Lasix 40 mg daily  Per  "daughter BP low at home.  She held BP medications over the past 2 days.  Normotensive here thus far.    Continue to hold medications due to TRELL     Type 2 diabetes mellitus (HCC)  Assessment & Plan  No results found for: \"HGBA1C\"    No results for input(s): \"POCGLU\" in the last 72 hours.    Blood Sugar Average: Last 72 hrs:    Diagnosed with dm about 3 weeks ago during hospitalization. A1c was 11.6. Daughter was informed that it may not be accurate due to her receiving multiple blood transfusions. However they have made large changes to her diet. BS checked multiple times per day. Range 100-180s.   Home regimen:   Metformin 500 mg bid (just started on 7/29)  Obtain A1c  SSI         VTE Pharmacologic Prophylaxis: VTE Score: 3 Moderate Risk (Score 3-4) - Pharmacological DVT Prophylaxis Ordered: heparin.  Code Status: Level 1 - Full Code   Discussion with family: Updated  (daughter) at bedside.    Anticipated Length of Stay: Patient will be admitted on an inpatient basis with an anticipated length of stay of greater than 2 midnights secondary to TRELL.    Total Time Spent on Date of Encounter in care of patient: 60 mins. This time was spent on one or more of the following: performing physical exam; counseling and coordination of care; obtaining or reviewing history; documenting in the medical record; reviewing/ordering tests, medications or procedures; communicating with other healthcare professionals and discussing with patient's family/caregivers.    Chief Complaint: lightheaded    History of Present Illness:  Zelda Galaviz is a 68 y.o. female with a PMH of type 2 diabetes, obesity, hypertension, CHF, anemia who presents to the hospital with daughter due to feeling lightheaded and low BP readings intermittently over the past 2 days.  Daughter is a nurse and good medical historian for patient.  Patient was [facility] to [facility] approximately 3 weeks ago due to upper GI bleed from gastric ulcer.  " Hemoglobin on admission was the high fours.  Patient required approximately 5 units PRBCs and bands were placed per EGD.  While there creatinine had peaked at 2.36 but seem to improve back to her baseline of low ones after receiving blood.  Patient was also diagnosed with diabetes with an A1c in the high 11's.  Discharged on metformin 500 mg twice daily.  No other medication changes made.  Over the past 3 weeks patient has made great strides to improve her diet.  Continues to eat 3 meals per day and take in adequate fluids but has decreased her sodium intake and high sugary foods.  Blood sugars ranging 100-180s.  Blood pressures have been lower than normal over the last couple of weeks but the last 24 to 48 hours became much lower with 70-90 systolic.  Daughter thus stopped her BP medications over the past 2 days.     In the ER patient found to have significant TRELL with creatinine 3.73.  Appears dry on exam.  Lower to normal blood pressures.     Review of Systems:  Review of Systems   Constitutional:  Positive for fatigue. Negative for fever.   HENT:  Negative for sore throat.    Respiratory:  Negative for cough, chest tightness and shortness of breath.    Cardiovascular:  Negative for chest pain.   Gastrointestinal:  Negative for abdominal distention, abdominal pain, diarrhea, nausea and vomiting.   Genitourinary:  Negative for difficulty urinating.   Musculoskeletal:  Negative for arthralgias.   Neurological:  Positive for light-headedness. Negative for weakness and headaches.   Psychiatric/Behavioral:  Negative for agitation and behavioral problems.    All other systems reviewed and are negative.      Past Medical and Surgical History:   Past Medical History:   Diagnosis Date    CAD (coronary artery disease)     COPD (chronic obstructive pulmonary disease) (HCC)     Diabetes mellitus (HCC)     Diastolic heart failure (HCC)     GI bleed     Hiatal hernia        Past Surgical History:   Procedure Laterality Date     " SECTION         Meds/Allergies:  Prior to Admission medications    Not on File     I have reviewed home medications with patient personally.    Allergies: No Known Allergies    Social History:  Marital Status:    Occupation: Unknown  Patient Pre-hospital Living Situation: Home  Patient Pre-hospital Level of Mobility: walks  Patient Pre-hospital Diet Restrictions: Diabetic  Substance Use History:   Social History     Substance and Sexual Activity   Alcohol Use Not Currently     Social History     Tobacco Use   Smoking Status Former    Types: Cigarettes    Start date:     Quit date:     Years since quittin.6   Smokeless Tobacco Never     Social History     Substance and Sexual Activity   Drug Use Never       Family History:  History reviewed. No pertinent family history.    Physical Exam:     Vitals:   Blood Pressure: 112/61 (24)  Pulse: 92 (24)  Temperature: 98.7 °F (37.1 °C) (24)  Temp Source: Temporal (24)  Respirations: 16 (24)  Height: 5' 5\" (165.1 cm) (24)  Weight - Scale: 97.9 kg (215 lb 12.8 oz) (24)  SpO2: 97 % (24)    Physical Exam  Vitals and nursing note reviewed.   Constitutional:       Appearance: Normal appearance. She is obese.   HENT:      Head: Normocephalic.   Eyes:      Extraocular Movements: Extraocular movements intact.      Pupils: Pupils are equal, round, and reactive to light.   Cardiovascular:      Rate and Rhythm: Normal rate and regular rhythm.      Heart sounds: No murmur heard.  Pulmonary:      Effort: Pulmonary effort is normal. No respiratory distress.      Breath sounds: Normal breath sounds. No wheezing.   Abdominal:      General: Bowel sounds are normal. There is no distension.      Tenderness: There is no abdominal tenderness. There is no guarding.   Musculoskeletal:         General: Normal range of motion.      Cervical back: Normal range of motion.      Right lower " "leg: No edema.      Left lower leg: No edema.   Skin:     General: Skin is warm.   Neurological:      General: No focal deficit present.      Mental Status: She is alert and oriented to person, place, and time.   Psychiatric:         Mood and Affect: Mood normal.         Behavior: Behavior normal.         Thought Content: Thought content normal.          Additional Data:     Lab Results:  Results from last 7 days   Lab Units 07/29/24  2259   WBC Thousand/uL 7.40   HEMOGLOBIN g/dL 9.8*   HEMATOCRIT % 33.0*   PLATELETS Thousands/uL 270   SEGS PCT % 55   LYMPHO PCT % 31   MONO PCT % 9   EOS PCT % 4     Results from last 7 days   Lab Units 07/29/24  2259   SODIUM mmol/L 132*   POTASSIUM mmol/L 3.3*   CHLORIDE mmol/L 96   CO2 mmol/L 23   BUN mg/dL 104*   CREATININE mg/dL 3.73*   ANION GAP mmol/L 13   CALCIUM mg/dL 9.5   ALBUMIN g/dL 3.9   TOTAL BILIRUBIN mg/dL 0.77   ALK PHOS U/L 42   ALT U/L 31   AST U/L 41*   GLUCOSE RANDOM mg/dL 135     Results from last 7 days   Lab Units 07/29/24  2259   INR  1.22*         No results found for: \"HGBA1C\"        Lines/Drains:  Invasive Devices       Peripheral Intravenous Line  Duration             Peripheral IV 07/29/24 Left Antecubital <1 day                        Imaging: Reviewed radiology reports from this admission including: chest xray  XR chest portable    (Results Pending)       EKG and Other Studies Reviewed on Admission:   EKG: NSR. HR 82.    ** Please Note: This note has been constructed using a voice recognition system. **      "

## 2024-07-30 NOTE — PLAN OF CARE
Problem: PAIN - ADULT  Goal: Verbalizes/displays adequate comfort level or baseline comfort level  Description: Interventions:  - Encourage patient to monitor pain and request assistance  - Assess pain using appropriate pain scale  - Administer analgesics based on type and severity of pain and evaluate response  - Implement non-pharmacological measures as appropriate and evaluate response  - Consider cultural and social influences on pain and pain management  - Notify physician/advanced practitioner if interventions unsuccessful or patient reports new pain  Outcome: Progressing     Problem: INFECTION - ADULT  Goal: Absence or prevention of progression during hospitalization  Description: INTERVENTIONS:  - Assess and monitor for signs and symptoms of infection  - Monitor lab/diagnostic results  - Monitor all insertion sites, i.e. indwelling lines, tubes, and drains  - Monitor endotracheal if appropriate and nasal secretions for changes in amount and color  - [place] appropriate cooling/warming therapies per order  - Administer medications as ordered  - Instruct and encourage patient and family to use good hand hygiene technique  - Identify and instruct in appropriate isolation precautions for identified infection/condition  Outcome: Progressing  Goal: Absence of fever/infection during neutropenic period  Description: INTERVENTIONS:  - Monitor WBC    Outcome: Progressing     Problem: SAFETY ADULT  Goal: Maintain or return to baseline ADL function  Description: INTERVENTIONS:  -  Assess patient's ability to carry out ADLs; assess patient's baseline for ADL function and identify physical deficits which impact ability to perform ADLs (bathing, care of mouth/teeth, toileting, grooming, dressing, etc.)  - Assess/evaluate cause of self-care deficits   - Assess range of motion  - Assess patient's mobility; develop plan if impaired  - Assess patient's need for assistive devices and provide as appropriate  - Encourage  maximum independence but intervene and supervise when necessary  - Involve family in performance of ADLs  - Assess for home care needs following discharge   - Consider OT consult to assist with ADL evaluation and planning for discharge  - Provide patient education as appropriate  Outcome: Progressing     Problem: DISCHARGE PLANNING  Goal: Discharge to home or other facility with appropriate resources  Description: INTERVENTIONS:  - Identify barriers to discharge w/patient and caregiver  - Arrange for needed discharge resources and transportation as appropriate  - Identify discharge learning needs (meds, wound care, etc.)  - Arrange for interpretive services to assist at discharge as needed  - Refer to Case Management Department for coordinating discharge planning if the patient needs post-hospital services based on physician/advanced practitioner order or complex needs related to functional status, cognitive ability, or social support system  Outcome: Progressing     Problem: Knowledge Deficit  Goal: Patient/family/caregiver demonstrates understanding of disease process, treatment plan, medications, and discharge instructions  Description: Complete learning assessment and assess knowledge base.  Interventions:  - Provide teaching at level of understanding  - Provide teaching via preferred learning methods  Outcome: Progressing     Problem: METABOLIC, FLUID AND ELECTROLYTES - ADULT  Goal: Electrolytes maintained within normal limits  Description: INTERVENTIONS:  - Monitor labs and assess patient for signs and symptoms of electrolyte imbalances  - Administer electrolyte replacement as ordered  - Monitor response to electrolyte replacements, including repeat lab results as appropriate  - Instruct patient on fluid and nutrition as appropriate  Outcome: Progressing  Goal: Fluid balance maintained  Description: INTERVENTIONS:  - Monitor labs   - Monitor I/O and WT  - Instruct patient on fluid and nutrition as  appropriate  - Assess for signs & symptoms of volume excess or deficit  Outcome: Progressing

## 2024-07-30 NOTE — PLAN OF CARE
Problem: PAIN - ADULT  Goal: Verbalizes/displays adequate comfort level or baseline comfort level  Description: Interventions:  - Encourage patient to monitor pain and request assistance  - Assess pain using appropriate pain scale  - Administer analgesics based on type and severity of pain and evaluate response  - Implement non-pharmacological measures as appropriate and evaluate response  - Consider cultural and social influences on pain and pain management  - Notify physician/advanced practitioner if interventions unsuccessful or patient reports new pain  Outcome: Progressing     Problem: INFECTION - ADULT  Goal: Absence or prevention of progression during hospitalization  Description: INTERVENTIONS:  - Assess and monitor for signs and symptoms of infection  - Monitor lab/diagnostic results  - Monitor all insertion sites, i.e. indwelling lines, tubes, and drains  - Monitor endotracheal if appropriate and nasal secretions for changes in amount and color  - [place] appropriate cooling/warming therapies per order  - Administer medications as ordered  - Instruct and encourage patient and family to use good hand hygiene technique  - Identify and instruct in appropriate isolation precautions for identified infection/condition  Outcome: Progressing  Goal: Absence of fever/infection during neutropenic period  Description: INTERVENTIONS:  - Monitor WBC    Outcome: Progressing     Problem: DISCHARGE PLANNING  Goal: Discharge to home or other facility with appropriate resources  Description: INTERVENTIONS:  - Identify barriers to discharge w/patient and caregiver  - Arrange for needed discharge resources and transportation as appropriate  - Identify discharge learning needs (meds, wound care, etc.)  - Arrange for interpretive services to assist at discharge as needed  - Refer to Case Management Department for coordinating discharge planning if the patient needs post-hospital services based on physician/advanced  practitioner order or complex needs related to functional status, cognitive ability, or social support system  Outcome: Progressing     Problem: Knowledge Deficit  Goal: Patient/family/caregiver demonstrates understanding of disease process, treatment plan, medications, and discharge instructions  Description: Complete learning assessment and assess knowledge base.  Interventions:  - Provide teaching at level of understanding  - Provide teaching via preferred learning methods  Outcome: Progressing     Problem: METABOLIC, FLUID AND ELECTROLYTES - ADULT  Goal: Electrolytes maintained within normal limits  Description: INTERVENTIONS:  - Monitor labs and assess patient for signs and symptoms of electrolyte imbalances  - Administer electrolyte replacement as ordered  - Monitor response to electrolyte replacements, including repeat lab results as appropriate  - Instruct patient on fluid and nutrition as appropriate  Outcome: Progressing  Goal: Fluid balance maintained  Description: INTERVENTIONS:  - Monitor labs   - Monitor I/O and WT  - Instruct patient on fluid and nutrition as appropriate  - Assess for signs & symptoms of volume excess or deficit  Outcome: Progressing

## 2024-07-30 NOTE — ASSESSMENT & PLAN NOTE
Upper GI bleed treated at [facility] approximately 3 weeks ago  Per daughter gastric ulcer felt to be due to NSAID use.   No further NSAID use since discharge  Daughter and patient deny any further bleeding since she was discharged  Continue to monitor

## 2024-07-30 NOTE — CASE MANAGEMENT
Case Management Assessment & Discharge Planning Note    Patient name Zelda Galaviz  Location /-01 MRN [id number]  : 1955 Date 2024       Current Admission Date: 2024  Current Admission Diagnosis:TRELL (acute kidney injury) (HCC)   Patient Active Problem List    Diagnosis Date Noted Date Diagnosed    TRELL (acute kidney injury) (HCC) 2024     Type 2 diabetes mellitus (HCC) 2024     Essential hypertension 2024     Obesity (BMI 30-39.9) 2024     Chronic diastolic congestive heart failure (HCC) 2024     H/O: upper GI bleed 2024     Anemia 2024       LOS (days): 0  Geometric Mean LOS (GMLOS) (days): 3.1  Days to GMLOS:2.7     OBJECTIVE:    Risk of Unplanned Readmission Score: 14.78         Current admission status: Inpatient       Preferred Pharmacy:   [id number] - [place] - [address]  [address] [address]  Phone: [phone] Fax: [phone]    Primary Care Provider: No primary care provider on file.    Primary Insurance: [facility]  Secondary Insurance:     ASSESSMENT:  Active Health Care Proxies    There are no active Health Care Proxies on file.       Advance Directives  Does patient have a Health Care POA?: No  Was patient offered paperwork?: Yes (information given)  Does patient have Advance Directives?: No  Was patient offered paperwork?: Yes (information given)  Primary Contact: kristina Andres         Readmission Root Cause  30 Day Readmission: No    Patient Information  Admitted from:: Home  Mental Status: Alert  During Assessment patient was accompanied by: Not accompanied during assessment  Assessment information provided by:: Patient  Primary Caregiver: Self  Support Systems: Daughter  County of Residence: [place]  What city do you live in?: [place]  Home entry access options. Select all that apply.: Stairs  Number of steps to enter home.: 4  Do the steps have railings?: Yes  Type of Current  Residence: [facility]  Living Arrangements: Lives w/ Daughter  Is patient a ?: No    Activities of Daily Living Prior to Admission  Functional Status: Independent  Completes ADLs independently?: Yes  Ambulates independently?: Yes  Does patient use assisted devices?: No  Does patient currently own DME?: Yes  What DME does the patient currently own?: Other (Comment) ([facility])  Does patient have a history of Outpatient Therapy (PT/OT)?: Yes  Does the patient have a history of Short-Term Rehab?: No  Does patient have a history of HHC?: No  Does patient currently have HHC?: No         Patient Information Continued  Income Source: Pension/FCI  Does patient have prescription coverage?: Yes  Does patient receive dialysis treatments?: No  Does patient have a history of substance abuse?: No  Does patient have a history of Mental Health Diagnosis?: No         Means of Transportation  Means of Transport to Appts:: Drives Self      Social Determinants of Health (SDOH)      Flowsheet Row Most Recent Value   Housing Stability    In the last 12 months, was there a time when you were not able to pay the mortgage or rent on time? N   In the past 12 months, how many times have you moved where you were living? 0   At any time in the past 12 months, were you homeless or living in a shelter (including now)? N   Transportation Needs    In the past 12 months, has lack of transportation kept you from medical appointments or from getting medications? no   In the past 12 months, has lack of transportation kept you from meetings, work, or from getting things needed for daily living? No   Food Insecurity    Within the past 12 months, you worried that your food would run out before you got the money to buy more. Never true   Within the past 12 months, the food you bought just didn't last and you didn't have money to get more. Never true   Utilities    In the past 12 months has the electric, gas, oil, or water company threatened to shut off  services in your home? No            DISCHARGE DETAILS:    Discharge planning discussed with:: patient     Comments - Freedom of Choice: patient alert and in contact with daughters      Met with patient with her daughter, Marjan present  to review the role of CM and discuss needs patient may have prior to discharge.  Patient recently dx with DM and will be staying with her daughter, Marjan in a ranch home with 4 SHIRLEY. Patient has a BGE and reports no other DME. She denies HHC.SNF. BHU and D&A rehab admissions. She will go home with Marjan and anticipates no discharge needs. Marjan will transport patient home.

## 2024-07-30 NOTE — ASSESSMENT & PLAN NOTE
"No results found for: \"HGBA1C\"    No results for input(s): \"POCGLU\" in the last 72 hours.    Blood Sugar Average: Last 72 hrs:    Diagnosed with dm about 3 weeks ago during hospitalization. A1c was 11.6. Daughter was informed that it may not be accurate due to her receiving multiple blood transfusions. However they have made large changes to her diet. BS checked multiple times per day. Range 100-180s.   Home regimen:   Metformin 500 mg bid (just started on 7/29)  Obtain A1c  SSI  "

## 2024-07-30 NOTE — QUICK NOTE
Urine sample   UA: Moderate amount of leukocytes, negative nitrite  Micro: 10-20 WBC, moderate amounts of bacteria    Admitted with TRELL     Start IV ceftriaxone   UC pending

## 2024-07-31 ENCOUNTER — APPOINTMENT (INPATIENT)
Dept: ULTRASOUND IMAGING | Facility: HOSPITAL | Age: 69
DRG: 683 | End: 2024-07-31
Payer: COMMERCIAL

## 2024-07-31 LAB
ANION GAP SERPL CALCULATED.3IONS-SCNC: 8 MMOL/L (ref 4–13)
BASOPHILS # BLD AUTO: 0.06 THOUSANDS/ÂΜL (ref 0–0.1)
BASOPHILS NFR BLD AUTO: 1 % (ref 0–1)
BUN SERPL-MCNC: 78 MG/DL (ref 5–25)
CALCIUM SERPL-MCNC: 8.7 MG/DL (ref 8.4–10.2)
CHLORIDE SERPL-SCNC: 111 MMOL/L (ref 96–108)
CO2 SERPL-SCNC: 23 MMOL/L (ref 21–32)
CREAT SERPL-MCNC: 1.76 MG/DL (ref 0.6–1.3)
EOSINOPHIL # BLD AUTO: 0.27 THOUSAND/ÂΜL (ref 0–0.61)
EOSINOPHIL NFR BLD AUTO: 6 % (ref 0–6)
EOSINOPHIL NFR URNS MANUAL: 2 %
ERYTHROCYTE [DISTWIDTH] IN BLOOD BY AUTOMATED COUNT: 19.4 % (ref 11.6–15.1)
FERRITIN SERPL-MCNC: 10 NG/ML (ref 11–307)
GFR SERPL CREATININE-BSD FRML MDRD: 29 ML/MIN/1.73SQ M
GLUCOSE SERPL-MCNC: 112 MG/DL (ref 65–140)
GLUCOSE SERPL-MCNC: 113 MG/DL (ref 65–140)
GLUCOSE SERPL-MCNC: 114 MG/DL (ref 65–140)
GLUCOSE SERPL-MCNC: 118 MG/DL (ref 65–140)
GLUCOSE SERPL-MCNC: 124 MG/DL (ref 65–140)
HCT VFR BLD AUTO: 25.8 % (ref 34.8–46.1)
HGB BLD-MCNC: 7.6 G/DL (ref 11.5–15.4)
IMM GRANULOCYTES # BLD AUTO: 0.02 THOUSAND/UL (ref 0–0.2)
IMM GRANULOCYTES NFR BLD AUTO: 0 % (ref 0–2)
IRON SATN MFR SERPL: 7 % (ref 15–50)
IRON SERPL-MCNC: 27 UG/DL (ref 50–212)
LYMPHOCYTES # BLD AUTO: 1.85 THOUSANDS/ÂΜL (ref 0.6–4.47)
LYMPHOCYTES NFR BLD AUTO: 41 % (ref 14–44)
MAGNESIUM SERPL-MCNC: 2.2 MG/DL (ref 1.9–2.7)
MCH RBC QN AUTO: 24.6 PG (ref 26.8–34.3)
MCHC RBC AUTO-ENTMCNC: 29.5 G/DL (ref 31.4–37.4)
MCV RBC AUTO: 84 FL (ref 82–98)
MONOCYTES # BLD AUTO: 0.48 THOUSAND/ÂΜL (ref 0.17–1.22)
MONOCYTES NFR BLD AUTO: 11 % (ref 4–12)
NEUTROPHILS # BLD AUTO: 1.85 THOUSANDS/ÂΜL (ref 1.85–7.62)
NEUTS SEG NFR BLD AUTO: 41 % (ref 43–75)
NRBC BLD AUTO-RTO: 0 /100 WBCS
PLATELET # BLD AUTO: 186 THOUSANDS/UL (ref 149–390)
PMV BLD AUTO: 10.2 FL (ref 8.9–12.7)
POTASSIUM SERPL-SCNC: 3.8 MMOL/L (ref 3.5–5.3)
RBC # BLD AUTO: 3.09 MILLION/UL (ref 3.81–5.12)
SODIUM SERPL-SCNC: 142 MMOL/L (ref 135–147)
TIBC SERPL-MCNC: 375 UG/DL (ref 250–450)
UIBC SERPL-MCNC: 348 UG/DL (ref 155–355)
WBC # BLD AUTO: 4.53 THOUSAND/UL (ref 4.31–10.16)

## 2024-07-31 PROCEDURE — 85025 COMPLETE CBC W/AUTO DIFF WBC: CPT | Performed by: INTERNAL MEDICINE

## 2024-07-31 PROCEDURE — 83540 ASSAY OF IRON: CPT | Performed by: INTERNAL MEDICINE

## 2024-07-31 PROCEDURE — 76775 US EXAM ABDO BACK WALL LIM: CPT

## 2024-07-31 PROCEDURE — 83735 ASSAY OF MAGNESIUM: CPT | Performed by: INTERNAL MEDICINE

## 2024-07-31 PROCEDURE — 83550 IRON BINDING TEST: CPT | Performed by: INTERNAL MEDICINE

## 2024-07-31 PROCEDURE — 99232 SBSQ HOSP IP/OBS MODERATE 35: CPT | Performed by: INTERNAL MEDICINE

## 2024-07-31 PROCEDURE — 82948 REAGENT STRIP/BLOOD GLUCOSE: CPT

## 2024-07-31 PROCEDURE — 82728 ASSAY OF FERRITIN: CPT | Performed by: INTERNAL MEDICINE

## 2024-07-31 PROCEDURE — 80048 BASIC METABOLIC PNL TOTAL CA: CPT | Performed by: INTERNAL MEDICINE

## 2024-07-31 PROCEDURE — 99232 SBSQ HOSP IP/OBS MODERATE 35: CPT | Performed by: PHYSICIAN ASSISTANT

## 2024-07-31 RX ORDER — DOCUSATE SODIUM 100 MG/1
100 CAPSULE, LIQUID FILLED ORAL 2 TIMES DAILY PRN
Status: DISCONTINUED | OUTPATIENT
Start: 2024-07-31 | End: 2024-08-01 | Stop reason: HOSPADM

## 2024-07-31 RX ADMIN — SODIUM CHLORIDE 100 ML/HR: 0.9 INJECTION, SOLUTION INTRAVENOUS at 00:30

## 2024-07-31 RX ADMIN — IRON SUCROSE 200 MG: 20 INJECTION, SOLUTION INTRAVENOUS at 18:43

## 2024-07-31 RX ADMIN — LIDOCAINE 5% 1 PATCH: 700 PATCH TOPICAL at 08:00

## 2024-07-31 RX ADMIN — UMECLIDINIUM BROMIDE AND VILANTEROL TRIFENATATE 1 PUFF: 62.5; 25 POWDER RESPIRATORY (INHALATION) at 08:01

## 2024-07-31 RX ADMIN — HEPARIN SODIUM 5000 UNITS: 5000 INJECTION, SOLUTION INTRAVENOUS; SUBCUTANEOUS at 21:54

## 2024-07-31 RX ADMIN — PANTOPRAZOLE SODIUM 40 MG: 40 TABLET, DELAYED RELEASE ORAL at 05:21

## 2024-07-31 RX ADMIN — HEPARIN SODIUM 5000 UNITS: 5000 INJECTION, SOLUTION INTRAVENOUS; SUBCUTANEOUS at 16:30

## 2024-07-31 RX ADMIN — HEPARIN SODIUM 5000 UNITS: 5000 INJECTION, SOLUTION INTRAVENOUS; SUBCUTANEOUS at 05:20

## 2024-07-31 RX ADMIN — ATORVASTATIN CALCIUM 20 MG: 20 TABLET, FILM COATED ORAL at 16:30

## 2024-07-31 RX ADMIN — PANTOPRAZOLE SODIUM 40 MG: 40 TABLET, DELAYED RELEASE ORAL at 16:30

## 2024-07-31 NOTE — APP STUDENT NOTE
[facility]RE[facility] STUDENT  Inpatient Progress Note for TRAINING ONLY  Not Part of Legal Medical Record     Progress Note - Zelda Galaviz 68 y.o. female MRN: [id number]  Unit/Bed#: -01 Encounter: [id number]        Assessment & plan notes cannot be loaded without a specified hospital service.     Zelda Galaviz is a 68 year old female with PMH of DM type 2, HTN, anemia, and obesity. She presented with hypotension and fatigue on 7/29. She was hospitalized one month ago with upper GI bleed and anemia requiring 5 units of PRBC. Bleed resolved with banding.    Acute Kidney Injury  -Likely caused by hypotension/hypoperfusion  -IV fluids administered 7/29 and 7/30  -BUN 78, down from 104 on admission. Creatinine 1.76, down from 3.73.  -Nephrology consulted. Renal US completed today. Interpretation pending    Anemia due to gastric bleed  -No signs of bleed currently. HgB 7.6  -Perfuse PRBC for HgB less than 7.0  -Maintaining pantoprazole 40mg BID  -Pt no longer taking NSAIDs since 3 weeks    Hypertension  -Valsartan, HCTZ, and Furosemide discontinued on admission  -BP is 111/55 today  -Patient reports implementation of lifestyle changes over last 3 weeks. Monitoring BP for medication adjustment on discharge.    DM type 2  -Pt. Only on Metformin outpatient.  -Sliding scale insulin currently  -Glucose stable in hospital  -HgB A1c 7.5% on 7/29    HFpEF  -On diuretics outpatient but being held currently  -States swelling has improved with lifestyle changes  -Follows with cardiologist outpatient        VTE Pharmacologic Prophylaxis:   Pharmacologic: Heparin  Mechanical VTE Prophylaxis in Place: No    Patient Centered Rounds: I have performed bedside rounds with nursing staff today.    Discussions with Specialists or Other Care Team Provider: ***    Education and Discussions with Family / Patient: ***    Time Spent for Care: {Time; Time 15 min - 1 hour:19605}.  More than 50% of total time spent on counseling and coordination of care as  described above.    Current Length of Stay: 1 day(s)    Current Patient Status: Inpatient   Certification Statement: The patient will continue to require additional inpatient hospital stay due to TRELL    Discharge Plan: ***    Code Status: Level 1 - Full Code    Subjective:   Pt. States she is feeling very well today. Denies SOB, pain, dysuria. She admits to mild lower leg swelling.    Objective:     Vitals:   Temp (24hrs), Av.6 °F (36.4 °C), Min:97.5 °F (36.4 °C), Max:97.7 °F (36.5 °C)    Temp:  [97.5 °F (36.4 °C)-97.7 °F (36.5 °C)] 97.6 °F (36.4 °C)  HR:  [64-81] 81  BP: ()/(48-55) 111/55  SpO2:  [93 %-95 %] 95 %  Body mass index is 35.78 kg/m².     Input and Output Summary (last 24 hours):       Intake/Output Summary (Last 24 hours) at 2024 1242  Last data filed at 2024 0845  Gross per 24 hour   Intake 2608.75 ml   Output 1750 ml   Net 858.75 ml       Physical Exam:     Physical Exam  Vitals and nursing note reviewed.   Constitutional:       General: She is not in acute distress.     Appearance: Normal appearance. She is not ill-appearing.   HENT:      Head: Normocephalic and atraumatic.      Mouth/Throat:      Mouth: Mucous membranes are moist.      Pharynx: Oropharynx is clear. No oropharyngeal exudate or posterior oropharyngeal erythema.   Eyes:      Pupils: Pupils are equal, round, and reactive to light.   Cardiovascular:      Rate and Rhythm: Normal rate and regular rhythm.      Pulses: Normal pulses.      Heart sounds: Normal heart sounds. No murmur heard.     No friction rub. No gallop.   Pulmonary:      Effort: Pulmonary effort is normal. No respiratory distress.      Breath sounds: Normal breath sounds. No stridor. No wheezing, rhonchi or rales.   Abdominal:      General: Abdomen is flat.      Palpations: Abdomen is soft.   Musculoskeletal:      Right lower leg: Edema present.      Left lower leg: Edema present.   Skin:     General: Skin is warm and dry.      Coloration: Skin is not  jaundiced or pale.      Findings: No bruising or rash.   Neurological:      General: No focal deficit present.      Mental Status: She is alert and oriented to person, place, and time.   Psychiatric:         Mood and Affect: Mood normal.         Behavior: Behavior normal.         Historical Information   Past Medical History:   Diagnosis Date    CAD (coronary artery disease)     COPD (chronic obstructive pulmonary disease) (HCC)     Diabetes mellitus (HCC)     Diastolic heart failure (HCC)     GI bleed     Hiatal hernia      Past Surgical History:   Procedure Laterality Date     SECTION       Social History   Social History     Substance and Sexual Activity   Alcohol Use Not Currently     Social History     Substance and Sexual Activity   Drug Use Never     Social History     Tobacco Use   Smoking Status Former    Types: Cigarettes    Start date:     Quit date:     Years since quittin.6   Smokeless Tobacco Never     Family History: {SL IP FAM HISTORY SMARTLIST:379638446}    Meds/Allergies   PTA meds:   Prior to Admission Medications   Prescriptions Last Dose Informant Patient Reported? Taking?   albuterol (PROVENTIL HFA,VENTOLIN HFA) 90 mcg/act inhaler Past Week  Yes Yes   Sig: Inhale 2 puffs every 6 (six) hours as needed for wheezing   atorvastatin (LIPITOR) 20 mg tablet 2024  Yes Yes   Sig: Take 20 mg by mouth daily In the morning   furosemide (LASIX) 40 mg tablet 2024  Yes Yes   Sig: Take 40 mg by mouth daily 1 pill in the morning   hydroCHLOROthiazide 25 mg tablet 2024  Yes Yes   Sig: Take 25 mg by mouth daily 1 tablet in the morning   metFORMIN (GLUCOPHAGE) 500 mg tablet 2024  Yes Yes   Sig: Take 500 mg by mouth 2 (two) times a day with meals   pantoprazole (PROTONIX) 40 mg tablet 2024  Yes Yes   Sig: Take 40 mg by mouth 2 (two) times a day   umeclidinium-vilanterol 62.5-25 mcg/actuation inhaler 2024  Yes Yes   Sig: Inhale 1 puff daily In the morning    valsartan (DIOVAN) 320 MG tablet 7/29/2024  Yes Yes   Sig: Take 320 mg by mouth daily 1 pill in the morning      Facility-Administered Medications: None     No Known Allergies    Additional Data:     Labs:    Results from last 7 days   Lab Units 07/31/24  0522   WBC Thousand/uL 4.53   HEMOGLOBIN g/dL 7.6*   HEMATOCRIT % 25.8*   PLATELETS Thousands/uL 186   SEGS PCT % 41*   LYMPHO PCT % 41   MONO PCT % 11   EOS PCT % 6     Results from last 7 days   Lab Units 07/31/24  0522 07/30/24  0513 07/29/24  2259   SODIUM mmol/L 142   < > 132*   POTASSIUM mmol/L 3.8   < > 3.3*   CHLORIDE mmol/L 111*   < > 96   CO2 mmol/L 23   < > 23   BUN mg/dL 78*   < > 104*   CREATININE mg/dL 1.76*   < > 3.73*   ANION GAP mmol/L 8   < > 13   CALCIUM mg/dL 8.7   < > 9.5   ALBUMIN g/dL  --   --  3.9   TOTAL BILIRUBIN mg/dL  --   --  0.77   ALK PHOS U/L  --   --  42   ALT U/L  --   --  31   AST U/L  --   --  41*   GLUCOSE RANDOM mg/dL 114   < > 135    < > = values in this interval not displayed.     Results from last 7 days   Lab Units 07/29/24  2259   INR  1.22*     Results from last 7 days   Lab Units 07/31/24  1145 07/31/24  0705 07/30/24  2056 07/30/24  1621 07/30/24  1113 07/30/24  0729   POC GLUCOSE mg/dl 113 124 150* 124 139 138     Results from last 7 days   Lab Units 07/29/24  2259   HEMOGLOBIN A1C % 7.5*             * I Have Reviewed All Lab Data Listed Above.  * Additional Pertinent Lab Tests Reviewed: All Labs For Current Hospital Admission Reviewed    Imaging:    Imaging Reports Reviewed Today Include: ***  Imaging Personally Reviewed by Myself Includes:  ***    Recent Cultures (last 7 days):     Results from last 7 days   Lab Units 07/30/24  0036   URINE CULTURE  60,000-69,000 cfu/ml Escherichia coli*       Last 24 Hours Medication List:   Current Facility-Administered Medications   Medication Dose Route Frequency Provider Last Rate    acetaminophen  650 mg Oral Q6H PRN Cyn Moran PA-C      albuterol  2 puff Inhalation Q6H PRN  Cyn Moran PA-C      atorvastatin  20 mg Oral Daily With Dinner Cyn Moran PA-C      docusate sodium  100 mg Oral BID PRN Moris Gilbert PA-C      heparin (porcine)  5,000 Units Subcutaneous Q8H [facility] Cyn Moran PA-C      insulin lispro  1-6 Units Subcutaneous TID AC Cyn Moran PA-C      insulin lispro  1-6 Units Subcutaneous HS Cyn Moran PA-C      lidocaine  1 patch Topical Daily Cyn Moran PA-C      pantoprazole  40 mg Oral BID AC Cyn Moran PA-C      umeclidinium-vilanterol  1 puff Inhalation Daily Cyn Moran PA-C          Today, Patient Was Seen By: Moris Vines    ** Please Note: Dictation voice to text software may have been used in the creation of this document. **

## 2024-07-31 NOTE — ASSESSMENT & PLAN NOTE
Presents from home with daughter due to feeling lightheaded and hypotension per home readings  Recent admission at [facility] (3 weeks ago) due to upper GI bleed/Gastric ulcer.  Bands placed per daughter.  Received approximately 5 units PRBC  Admit Creatinine 3.73 (1.3 on 7/15)  2.36 was peak at [facility]   Renal US WNL  Urinary retention protocol   Hold home valsartan and HCTZ  Resume Lasix at 20 mg daily tomorrow  Cr better at 1.2 today  Status post IV fluids  Nephrology consult appreciated  Repeat BMP in 1 week

## 2024-07-31 NOTE — ASSESSMENT & PLAN NOTE
Lab Results   Component Value Date    HGBA1C 7.5 (H) 07/29/2024       Recent Labs     07/30/24  1113 07/30/24  1621 07/30/24 2056 07/31/24  0705   POCGLU 139 124 150* 124       Blood Sugar Average: Last 72 hrs:  (P) 135  Diagnosed with dm about 3 weeks ago during hospitalization. A1c was 11.6. Daughter was informed that it may not be accurate due to her receiving multiple blood transfusions. However they have made large changes to her diet. BS checked multiple times per day. Range 100-180s.   Home regimen:   Metformin 500 mg bid (just started on 7/29)  Obtain A1c  SSI

## 2024-07-31 NOTE — ASSESSMENT & PLAN NOTE
Presents from home with daughter due to feeling lightheaded and hypotension per home readings  Recent admission at [facility] (3 weeks ago) due to upper GI bleed/Gastric ulcer.  Bands placed per daughter.  Received approximately 5 units PRBC  Admit Creatinine 3.73 (1.3 on 7/15)  2.36 was peak at [facility]   Renal US pending  Urinary retention protocol   Hold home valsartan, Lasix, HCTZ  Cr better at 1.7 today  Stop IV fluids  Nephrology consult appreciated

## 2024-07-31 NOTE — PLAN OF CARE
Problem: PAIN - ADULT  Goal: Verbalizes/displays adequate comfort level or baseline comfort level  Description: Interventions:  - Encourage patient to monitor pain and request assistance  - Assess pain using appropriate pain scale  - Administer analgesics based on type and severity of pain and evaluate response  - Implement non-pharmacological measures as appropriate and evaluate response  - Consider cultural and social influences on pain and pain management  - Notify physician/advanced practitioner if interventions unsuccessful or patient reports new pain  Outcome: Progressing     Problem: INFECTION - ADULT  Goal: Absence or prevention of progression during hospitalization  Description: INTERVENTIONS:  - Assess and monitor for signs and symptoms of infection  - Monitor lab/diagnostic results  - Monitor all insertion sites, i.e. indwelling lines, tubes, and drains  - Monitor endotracheal if appropriate and nasal secretions for changes in amount and color  - [place] appropriate cooling/warming therapies per order  - Administer medications as ordered  - Instruct and encourage patient and family to use good hand hygiene technique  - Identify and instruct in appropriate isolation precautions for identified infection/condition  Outcome: Progressing  Goal: Absence of fever/infection during neutropenic period  Description: INTERVENTIONS:  - Monitor WBC    Outcome: Progressing     Problem: SAFETY ADULT  Goal: Maintain or return to baseline ADL function  Description: INTERVENTIONS:  -  Assess patient's ability to carry out ADLs; assess patient's baseline for ADL function and identify physical deficits which impact ability to perform ADLs (bathing, care of mouth/teeth, toileting, grooming, dressing, etc.)  - Assess/evaluate cause of self-care deficits   - Assess range of motion  - Assess patient's mobility; develop plan if impaired  - Assess patient's need for assistive devices and provide as appropriate  - Encourage  maximum independence but intervene and supervise when necessary  - Involve family in performance of ADLs  - Assess for home care needs following discharge   - Consider OT consult to assist with ADL evaluation and planning for discharge  - Provide patient education as appropriate  Outcome: Progressing     Problem: DISCHARGE PLANNING  Goal: Discharge to home or other facility with appropriate resources  Description: INTERVENTIONS:  - Identify barriers to discharge w/patient and caregiver  - Arrange for needed discharge resources and transportation as appropriate  - Identify discharge learning needs (meds, wound care, etc.)  - Arrange for interpretive services to assist at discharge as needed  - Refer to Case Management Department for coordinating discharge planning if the patient needs post-hospital services based on physician/advanced practitioner order or complex needs related to functional status, cognitive ability, or social support system  Outcome: Progressing     Problem: Knowledge Deficit  Goal: Patient/family/caregiver demonstrates understanding of disease process, treatment plan, medications, and discharge instructions  Description: Complete learning assessment and assess knowledge base.  Interventions:  - Provide teaching at level of understanding  - Provide teaching via preferred learning methods  Outcome: Progressing     Problem: METABOLIC, FLUID AND ELECTROLYTES - ADULT  Goal: Electrolytes maintained within normal limits  Description: INTERVENTIONS:  - Monitor labs and assess patient for signs and symptoms of electrolyte imbalances  - Administer electrolyte replacement as ordered  - Monitor response to electrolyte replacements, including repeat lab results as appropriate  - Instruct patient on fluid and nutrition as appropriate  Outcome: Progressing  Goal: Fluid balance maintained  Description: INTERVENTIONS:  - Monitor labs   - Monitor I/O and WT  - Instruct patient on fluid and nutrition as  appropriate  - Assess for signs & symptoms of volume excess or deficit  Outcome: Progressing     Problem: Potential for Falls  Goal: Patient will remain free of falls  Description: INTERVENTIONS:  - Educate patient/family on patient safety including physical limitations  - Instruct patient to call for assistance with activity   - Consult OT/PT to assist with strengthening/mobility   - Keep Call bell within reach  - Keep bed low and locked with side rails adjusted as appropriate  - Keep care items and personal belongings within reach  - Initiate and maintain comfort rounds  - Make Fall Risk Sign visible to staff  - Offer Toileting every 2 Hours, in advance of need  - Initiate/Maintain alarm  - Obtain necessary fall risk management equipment  - Apply yellow socks and bracelet for high fall risk patients  - Consider moving patient to room near nurses station  Outcome: Progressing

## 2024-07-31 NOTE — PLAN OF CARE
Problem: PAIN - ADULT  Goal: Verbalizes/displays adequate comfort level or baseline comfort level  Description: Interventions:  - Encourage patient to monitor pain and request assistance  - Assess pain using appropriate pain scale  - Administer analgesics based on type and severity of pain and evaluate response  - Implement non-pharmacological measures as appropriate and evaluate response  - Consider cultural and social influences on pain and pain management  - Notify physician/advanced practitioner if interventions unsuccessful or patient reports new pain  Outcome: Progressing     Problem: INFECTION - ADULT  Goal: Absence or prevention of progression during hospitalization  Description: INTERVENTIONS:  - Assess and monitor for signs and symptoms of infection  - Monitor lab/diagnostic results  - Monitor all insertion sites, i.e. indwelling lines, tubes, and drains  - Monitor endotracheal if appropriate and nasal secretions for changes in amount and color  - [place] appropriate cooling/warming therapies per order  - Administer medications as ordered  - Instruct and encourage patient and family to use good hand hygiene technique  - Identify and instruct in appropriate isolation precautions for identified infection/condition  Outcome: Progressing  Goal: Absence of fever/infection during neutropenic period  Description: INTERVENTIONS:  - Monitor WBC    Outcome: Progressing     Problem: SAFETY ADULT  Goal: Maintain or return to baseline ADL function  Description: INTERVENTIONS:  -  Assess patient's ability to carry out ADLs; assess patient's baseline for ADL function and identify physical deficits which impact ability to perform ADLs (bathing, care of mouth/teeth, toileting, grooming, dressing, etc.)  - Assess/evaluate cause of self-care deficits   - Assess range of motion  - Assess patient's mobility; develop plan if impaired  - Assess patient's need for assistive devices and provide as appropriate  - Encourage  maximum independence but intervene and supervise when necessary  - Involve family in performance of ADLs  - Assess for home care needs following discharge   - Consider OT consult to assist with ADL evaluation and planning for discharge  - Provide patient education as appropriate  Outcome: Progressing     Problem: DISCHARGE PLANNING  Goal: Discharge to home or other facility with appropriate resources  Description: INTERVENTIONS:  - Identify barriers to discharge w/patient and caregiver  - Arrange for needed discharge resources and transportation as appropriate  - Identify discharge learning needs (meds, wound care, etc.)  - Arrange for interpretive services to assist at discharge as needed  - Refer to Case Management Department for coordinating discharge planning if the patient needs post-hospital services based on physician/advanced practitioner order or complex needs related to functional status, cognitive ability, or social support system  Outcome: Progressing     Problem: Knowledge Deficit  Goal: Patient/family/caregiver demonstrates understanding of disease process, treatment plan, medications, and discharge instructions  Description: Complete learning assessment and assess knowledge base.  Interventions:  - Provide teaching at level of understanding  - Provide teaching via preferred learning methods  Outcome: Progressing     Problem: METABOLIC, FLUID AND ELECTROLYTES - ADULT  Goal: Electrolytes maintained within normal limits  Description: INTERVENTIONS:  - Monitor labs and assess patient for signs and symptoms of electrolyte imbalances  - Administer electrolyte replacement as ordered  - Monitor response to electrolyte replacements, including repeat lab results as appropriate  - Instruct patient on fluid and nutrition as appropriate  Outcome: Progressing  Goal: Fluid balance maintained  Description: INTERVENTIONS:  - Monitor labs   - Monitor I/O and WT  - Instruct patient on fluid and nutrition as  appropriate  - Assess for signs & symptoms of volume excess or deficit  Outcome: Progressing     Problem: Potential for Falls  Goal: Patient will remain free of falls  Description: INTERVENTIONS:  - Educate patient/family on patient safety including physical limitations  - Instruct patient to call for assistance with activity   - Consult OT/PT to assist with strengthening/mobility   - Keep Call bell within reach  - Keep bed low and locked with side rails adjusted as appropriate  - Keep care items and personal belongings within reach  - Initiate and maintain comfort rounds  - Make Fall Risk Sign visible to staff  - Offer Toileting every 2 Hours, in advance of need  - Initiate/Maintain alarm  - Obtain necessary fall risk management equipment  - Apply yellow socks and bracelet for high fall risk patients  - Consider moving patient to room near nurses station  Outcome: Progressing

## 2024-07-31 NOTE — ASSESSMENT & PLAN NOTE
Wt Readings from Last 3 Encounters:   07/30/24 97.5 kg (215 lb)     Appears hypovolemic on exam  Per daughter bilateral lower extremity swelling has improved significantly over the past 3 weeks with patient's dietary changes.  Continues to eat 3 meals per day but has cut out lots of sodium and high sugary foods  Hold home Lasix and HCTZ  I/O and daily weights

## 2024-07-31 NOTE — PROGRESS NOTES
[facility]  Progress Note  Name: Zelda Galaviz I  MRN: [id number]  Unit/Bed#: -01 I Date of Admission: 7/29/2024   Date of Service: 7/31/2024 I Hospital Day: 1    Assessment & Plan   * TRELL (acute kidney injury) (HCC)  Assessment & Plan  Presents from home with daughter due to feeling lightheaded and hypotension per home readings  Recent admission at [facility] (3 weeks ago) due to upper GI bleed/Gastric ulcer.  Bands placed per daughter.  Received approximately 5 units PRBC  Admit Creatinine 3.73 (1.3 on 7/15)  2.36 was peak at [facility]   Renal US pending  Urinary retention protocol   Hold home valsartan, Lasix, HCTZ  Cr better at 1.7 today  Nephrology consult appreciated    Anemia  Assessment & Plan  Hg 9.8  -> 7.6 after IV fluids  Likley dilution   Recent upper GI bleed 3 weeks ago. Received 5 units PRBC at that time   Continue to monitor and transfuse if less than 7  Follow up iron panel     H/O: upper GI bleed  Assessment & Plan  Upper GI bleed treated at [facility] approximately 3 weeks ago  Per daughter gastric ulcer felt to be due to NSAID use.   No further NSAID use since discharge  Daughter and patient deny any further bleeding since she was discharged  Continue to monitor    Chronic diastolic congestive heart failure (HCC)  Assessment & Plan  Wt Readings from Last 3 Encounters:   07/30/24 97.5 kg (215 lb)     Appears hypovolemic on exam  Per daughter bilateral lower extremity swelling has improved significantly over the past 3 weeks with patient's dietary changes.  Continues to eat 3 meals per day but has cut out lots of sodium and high sugary foods  Hold home Lasix and HCTZ  I/O and daily weights    Obesity (BMI 30-39.9)  Assessment & Plan  Body mass index is 35.78 kg/m².  Encourage lifestyle changes    Essential hypertension  Assessment & Plan  Home regimen:   Valsartan 320 mg daily  HCTZ 25 mg daily  Lasix 40 mg daily  Per daughter BP low at  home.  She held BP medications over the past 2 days.  Normotensive here thus far.    Continue to hold medications due to TRELL     Type 2 diabetes mellitus (HCC)  Assessment & Plan  Lab Results   Component Value Date    HGBA1C 7.5 (H) 2024       Recent Labs     24  1113 24  1621 24  2056 24  0705   POCGLU 139 124 150* 124       Blood Sugar Average: Last 72 hrs:  (P) 135  Diagnosed with dm about 3 weeks ago during hospitalization. A1c was 11.6. Daughter was informed that it may not be accurate due to her receiving multiple blood transfusions. However they have made large changes to her diet. BS checked multiple times per day. Range 100-180s.   Home regimen:   Metformin 500 mg bid (just started on )  Obtain A1c  SSI         Mobility:   Basic Mobility Inpatient Raw Score: 24  JH-HLM Goal: 8: Walk 250 feet or more  JH-HLM Achieved: 8: Walk 250 feet ot more  JH-HLM Goal achieved. Continue to encourage appropriate mobility.    VTE Pharmacologic Prophylaxis: VTE Score: 3 Moderate Risk (Score 3-4) - Pharmacological DVT Prophylaxis Ordered: heparin.    Education and Discussions with Family / Patient: Updated  (daughter) at bedside.    Time Spent for Care: 35 minutes. More than 50% of total time spent on counseling and coordination of care as described above.    Current Length of Stay: 1 day(s)  Current Patient Status: Inpatient   Certification Statement: The patient will continue to require additional inpatient hospital stay due to TRELL on IV fluids  Discharge Plan: Anticipate discharge in 24-48 hrs to home.    Code Status: Level 1 - Full Code    Subjective:   No overnight events reported.  Patient feeling a lot better today, more energy.  She is urinating and eating.  No bowel movement yet.    Objective:     Vitals:   Temp (24hrs), Av.6 °F (36.4 °C), Min:97.5 °F (36.4 °C), Max:97.7 °F (36.5 °C)    Temp:  [97.5 °F (36.4 °C)-97.7 °F (36.5 °C)] 97.6 °F (36.4 °C)  HR:  [64-81]  81  BP: ()/(48-55) 111/55  SpO2:  [93 %-95 %] 95 %  Body mass index is 35.78 kg/m².     Input and Output Summary (last 24 hours):     Intake/Output Summary (Last 24 hours) at 7/31/2024 1038  Last data filed at 7/31/2024 0845  Gross per 24 hour   Intake 2608.75 ml   Output 1750 ml   Net 858.75 ml       Physical Exam:   Physical Exam  Vitals and nursing note reviewed.   Constitutional:       General: She is not in acute distress.     Appearance: Normal appearance.   HENT:      Head: Normocephalic.      Mouth/Throat:      Mouth: Mucous membranes are moist.   Eyes:      General: No scleral icterus.     Pupils: Pupils are equal, round, and reactive to light.   Cardiovascular:      Rate and Rhythm: Normal rate and regular rhythm.      Heart sounds: No murmur heard.  Pulmonary:      Effort: Pulmonary effort is normal. No respiratory distress.      Breath sounds: Normal breath sounds. No wheezing, rhonchi or rales.   Abdominal:      General: Bowel sounds are normal. There is no distension.      Palpations: Abdomen is soft.      Tenderness: There is no abdominal tenderness.   Musculoskeletal:         General: No swelling.      Right lower leg: No edema.      Left lower leg: Edema present.   Skin:     Capillary Refill: Capillary refill takes less than 2 seconds.   Neurological:      General: No focal deficit present.      Mental Status: She is alert and oriented to person, place, and time. Mental status is at baseline.          Additional Data:     Labs:  Results from last 7 days   Lab Units 07/31/24  0522   WBC Thousand/uL 4.53   HEMOGLOBIN g/dL 7.6*   HEMATOCRIT % 25.8*   PLATELETS Thousands/uL 186   SEGS PCT % 41*   LYMPHO PCT % 41   MONO PCT % 11   EOS PCT % 6     Results from last 7 days   Lab Units 07/31/24  0522 07/30/24  0513 07/29/24  2259   SODIUM mmol/L 142   < > 132*   POTASSIUM mmol/L 3.8   < > 3.3*   CHLORIDE mmol/L 111*   < > 96   CO2 mmol/L 23   < > 23   BUN mg/dL 78*   < > 104*   CREATININE mg/dL 1.76*    < > 3.73*   ANION GAP mmol/L 8   < > 13   CALCIUM mg/dL 8.7   < > 9.5   ALBUMIN g/dL  --   --  3.9   TOTAL BILIRUBIN mg/dL  --   --  0.77   ALK PHOS U/L  --   --  42   ALT U/L  --   --  31   AST U/L  --   --  41*   GLUCOSE RANDOM mg/dL 114   < > 135    < > = values in this interval not displayed.     Results from last 7 days   Lab Units 07/29/24  2259   INR  1.22*     Results from last 7 days   Lab Units 07/31/24  0705 07/30/24  2056 07/30/24  1621 07/30/24  1113 07/30/24  0729   POC GLUCOSE mg/dl 124 150* 124 139 138     Results from last 7 days   Lab Units 07/29/24  2259   HEMOGLOBIN A1C % 7.5*           Imaging: No pertinent imaging reviewed.    Recent Cultures (last 7 days):   Results from last 7 days   Lab Units 07/30/24  0036   URINE CULTURE  60,000-69,000 cfu/ml Escherichia coli*       Last 24 Hours Medication List:   Current Facility-Administered Medications   Medication Dose Route Frequency Provider Last Rate    acetaminophen  650 mg Oral Q6H PRN Cyn Moran PA-C      albuterol  2 puff Inhalation Q6H PRN Cyn Moran PA-C      atorvastatin  20 mg Oral Daily With Dinner Cyn Moran PA-C      heparin (porcine)  5,000 Units Subcutaneous Q8H [facility] Cyn Moran PA-C      insulin lispro  1-6 Units Subcutaneous TID AC Cyn Moran PA-C      insulin lispro  1-6 Units Subcutaneous HS Cyn Moran PA-C      lidocaine  1 patch Topical Daily yCn Moran PA-C      pantoprazole  40 mg Oral BID AC Cyn Moran PA-C      sodium chloride  100 mL/hr Intravenous Continuous He Chambers  mL/hr (07/31/24 0030)    umeclidinium-vilanterol  1 puff Inhalation Daily Cyn Moran PA-C          Today, Patient Was Seen By: Moris Gilbert PA-C    **Please Note: This note may have been constructed using a voice recognition system.**

## 2024-07-31 NOTE — UTILIZATION REVIEW
Initial Clinical Review    Admission: Date/Time/Statement:   Admission Orders (From admission, onward)       Ordered        07/30/24 0008  INPATIENT ADMISSION  Once                          Orders Placed This Encounter   Procedures    INPATIENT ADMISSION     Standing Status:   Standing     Number of Occurrences:   1     Order Specific Question:   Level of Care     Answer:   Med Surg [16]     Order Specific Question:   Estimated length of stay     Answer:   More than 2 Midnights     Order Specific Question:   Certification     Answer:   I certify that inpatient services are medically necessary for this patient for a duration of greater than two midnights. See H&P and MD Progress Notes for additional information about the patient's course of treatment.     ED Arrival Information       Expected   -    Arrival   7/29/2024 22:29    Acuity   Urgent              Means of arrival   Walk-In    Escorted by   Family Member    Service   Hospitalist    Admission type   Emergency              Arrival complaint   lightheaded, hypotension             Chief Complaint   Patient presents with    Dizziness     Pt presents with c/o feeling lightheaded since waking up with hypotension. Recently admitted for GI bleed 3 weeks ago.        Initial Presentation: 68 y.o. female PMH of type 2 diabetes, obesity, hypertension, CHF, anemia who presents to the hospital with daughter (is a nurse)  due to feeling lightheaded and low BP readings intermittently over the past 2 days. Appprox 3 wks ago, hospitalized [facility] for GIB 2/2 gastric ulcer : bands were placed per EGD.  While there Crt peaked at 2.36 but returned to baseline of low ones.  Also dx w/new DM  - dc on metformin & pantoprazole (? Cause nephritis) .        Over the past 3 weeks patient has made great strides to improve her diet w/ Blood sugars ranging 100-180s.  BP's lower than normal over the last couple of weeks but the last 24 to 48 hours became much lower with 70-90  systolic.  Daughter thus stopped her BP medications over the past 2 days.      7/29     In the ER patient found to have significant TRELL with creatinine 3.73. hgb       Appears dry on exam.  Lower to normal blood pressures. C/o  fatigue and light headedness   oriented.  No extremity edema. Breath sounds normal.  UA w/leukocytes and bacteria.       7/30   Admit INPT status,  MS Level of care for management of  TRELL, anemia, hypotension, suspected UTI   in setting of DM  Cont   gentle  IVF  (NS @  100 cc/hr)   consult nephrology,  HOLD  home diuretics,  manage volume status (I/O q shift,  daily wgt)  daily CBC And  bmp w/renal indices.  Replete electrolytes prn.   VS q4H.  Accucks qid w/SSI.  Cont protonix.  Ck post void bladder scan q shift,  obtain echo.  Start IV Rocephin, fup urine culture    7/30  NEPHROLOGY    determine etiology  (volume depleted vs obstruction vs interstitial nephritis)  obtain renal US,  ck Urine eosinophil percentage, bmp, cont IVF, cont holding all antihypertensives.      Anticipated Length of Stay/Certification Statement: Patient will be admitted on an inpatient basis with an anticipated length of stay of greater than 2 midnights secondary to TRELL.     ED Triage Vitals   Temperature Pulse Respirations Blood Pressure SpO2 Pain Score   07/29/24 2224 07/29/24 2224 07/29/24 2224 07/29/24 2224 07/29/24 2224 07/30/24 0045   99.2 °F (37.3 °C) 95 18 100/65 94 % No Pain     Weight (last 2 days)       Date/Time Weight    07/30/24 1030 97.5 (215)    07/30/24 00:31:59 97.9 (215.8)    07/30/24 0000 97.9 (215.8)    07/29/24 2229 96.2 (212)          Bmi  35.28      Vital Signs (last 3 days)       Date/Time Temp Pulse Resp BP MAP (mmHg) SpO2 O2 Device Hamel Coma Scale Score    07/31/24 07:11:15 -- 81 -- 111/55 74 95 % None (Room air) --    07/31/24 07:09:17 -- 78 -- 87/48 61 94 % None (Room air) --    07/30/24 2100 -- -- -- -- -- -- -- 15    07/30/24 20:50:41 97.7 °F (36.5 °C) 64 -- 110/50 70 95 % None  (Room air) --    07/30/24 16:24:51 97.5 °F (36.4 °C) 74 -- 112/48 69 93 % -- --    07/30/24 1030 -- 75 -- 99/53 -- -- Approx -- --    07/30/24 0844 -- -- -- -- -- -- -- 15    07/30/24 07:36:44 97.8 °F (36.6 °C) 75 -- 99/53 68 94 % None (Room air) --    07/30/24 0045 -- -- -- -- -- -- None (Room air) --    07/30/24 00:31:59 98.7 °F (37.1 °C) 92 16 112/61 78 97 % -- --    07/30/24 0000 -- 83 18 106/52 75 95 % None (Room air) --    07/29/24 2330 -- 78 18 110/53 77 94 % None (Room air) 15    07/29/24 2245 -- 82 18 114/55 79 92 % None (Room air) --    07/29/24 2224 99.2 °F (37.3 °C) 95 18 100/65 -- 94 % None (Room air) --              Pertinent Labs/Diagnostic Test Results:   Radiology:  XR chest portable   Final Interpretation by Yuki Chin MD (07/30 0909)      No acute cardiopulmonary disease.            Workstation performed: [id number]         US kidney and bladder    (Results Pending)     Cardiology:  Echo complete w/ contrast if indicated   Final Result by Lauren Fuller MD (07/30 3063)        Left Ventricle: Left ventricular cavity size is normal. Wall thickness    is mildly increased. There is concentric remodeling. The left ventricular    ejection fraction is 70-75%. Systolic function is hyperdynamic. Wall    motion is normal. Diastolic function is mildly abnormal, consistent with    grade I (abnormal) relaxation.     Right Ventricle: Right ventricular cavity size is normal. Systolic    function is normal.     Left Atrium: The atrium is normal in size.     Right Atrium: The atrium is normal in size.     Mitral Valve: There is mild annular calcification.         ECG 12 lead   Final Result by Lauren Fuller MD (07/30 3156)   Sinus rhythm with occasional Premature ventricular complexes   Nonspecific ST abnormality   Prolonged QT   Abnormal ECG   When compared with ECG of 29-JUL-2024 22:28, (unconfirmed)   No significant change was found   Confirmed by Lauren Fuller ([id number]) on 7/30/2024 9:43:46  PM      ECG 12 lead   Final Result by Lauren Fuller MD (07/30 2141)   Sinus rhythm with occasional Premature ventricular complexes   Prolonged QT   Abnormal ECG   No previous ECGs available   Confirmed by Lauren Fuller ([id number]) on 7/30/2024 9:41:20 PM              Results from last 7 days   Lab Units 07/31/24  0522 07/30/24  0513 07/29/24  2259   WBC Thousand/uL 4.53 6.05 7.40   HEMOGLOBIN g/dL 7.6* 8.5* 9.8*   HEMATOCRIT % 25.8* 27.9* 33.0*   PLATELETS Thousands/uL 186 210 270   TOTAL NEUT ABS Thousands/µL 1.85 2.67 4.07         Results from last 7 days   Lab Units 07/31/24 0522 07/30/24 0513 07/29/24 2259   SODIUM mmol/L 142 135 132*   POTASSIUM mmol/L 3.8 3.2* 3.3*   CHLORIDE mmol/L 111* 102 96   CO2 mmol/L 23 23 23   ANION GAP mmol/L 8 10 13   BUN mg/dL 78* 106* 104*   CREATININE mg/dL 1.76* 3.15* 3.73*   EGFR ml/min/1.73sq m 29 14 11   CALCIUM mg/dL 8.7 8.8 9.5   MAGNESIUM mg/dL 2.2  --   --      Results from last 7 days   Lab Units 07/29/24 2259   AST U/L 41*   ALT U/L 31   ALK PHOS U/L 42   TOTAL PROTEIN g/dL 8.0   ALBUMIN g/dL 3.9   TOTAL BILIRUBIN mg/dL 0.77     Results from last 7 days   Lab Units 07/31/24  0705 07/30/24  2056 07/30/24  1621 07/30/24  1113 07/30/24  0729   POC GLUCOSE mg/dl 124 150* 124 139 138     Results from last 7 days   Lab Units 07/31/24  0522 07/30/24  0513 07/29/24  2259   GLUCOSE RANDOM mg/dL 114 130 135         Results from last 7 days   Lab Units 07/29/24  2259   HEMOGLOBIN A1C % 7.5*   EAG mg/dl 169       Results from last 7 days   Lab Units 07/29/24  2259   CK TOTAL U/L 264*     Results from last 7 days   Lab Units 07/30/24  0041 07/29/24  2259   HS TNI 0HR ng/L  --  15   HS TNI 2HR ng/L 15  --    HSTNI D2 ng/L 0  --          Results from last 7 days   Lab Units 07/29/24  2259   PROTIME seconds 15.9*   INR  1.22*   PTT seconds 33       Results from last 7 days   Lab Units 07/30/24  0036   CLARITY UA  Clear   COLOR UA  Yellow   SPEC GRAV UA  1.010   PH UA  5.0    GLUCOSE UA mg/dl Negative   KETONES UA mg/dl Negative   BLOOD UA  Negative   PROTEIN UA mg/dl Negative   NITRITE UA  Negative   BILIRUBIN UA  Negative   UROBILINOGEN UA (BE) mg/dl <2.0   LEUKOCYTES UA  Moderate*   WBC UA /hpf 10-20*   RBC UA /hpf None Seen   BACTERIA UA /hpf Moderate*   EPITHELIAL CELLS WET PREP /hpf Occasional       ED Treatment-Medication Administration from 07/29/2024 2208 to 07/30/2024 0022         Date/Time Order Dose Route Action     07/29/2024 2257 pantoprazole (PROTONIX) injection 40 mg 40 mg Intravenous Given     07/29/2024 2257 sodium chloride 0.9 % bolus 1,000 mL 1,000 mL Intravenous New Bag     07/30/2024 0009 potassium chloride (Klor-Con M20) CR tablet 40 mEq 40 mEq Oral Given            Past Medical History:   Diagnosis Date    CAD (coronary artery disease)     COPD (chronic obstructive pulmonary disease) ([facility])     Diabetes mellitus ([facility])     Diastolic heart failure ([facility])     GI bleed     Hiatal hernia        Admitting Diagnosis: Dizzy [R42]  TRELL (acute kidney injury) ([facility]) [N17.9]  Age/Sex: 68 y.o. female  Admission Orders:     see above     Scheduled Medications:  atorvastatin, 20 mg, Oral, Daily With Dinner  heparin (porcine), 5,000 Units, Subcutaneous, Q8H ANN  insulin lispro, 1-6 Units, Subcutaneous, TID AC  insulin lispro, 1-6 Units, Subcutaneous, HS  lidocaine, 1 patch, Topical, Daily  pantoprazole, 40 mg, Oral, BID AC  umeclidinium-vilanterol, 1 puff, Inhalation, Daily      Continuous IV Infusions:  sodium chloride, 100 mL/hr, Intravenous, Continuous      PRN Meds:  acetaminophen, 650 mg, Oral, Q6H PRN  albuterol, 2 puff, Inhalation, Q6H PRN      ============================================================================================    Continued Stay Review    Date:  8/01                            Current Patient Class: Inpatient  Current Level of Care: ms    HPI:68 y.o. female initially admitted on 7/30  for management of TRELL, anemia, hypotension, suspected UTI in  setting of DM      Date: 8/01       Day 3: Has surpassed a 2nd midnight with active treatments and services    8/01   Assessment/Plan: pt reports feels well,  denies N/V/D - tolerating diet.  Trace pedal edema.  No resp distress - no wheezing or rales.   currently off all IV fluid now.  Renal ultrasound with unremarkable echogenicity and contour bilaterally, no hydronephrosis   Hold HCTZ and valsartan at time of discharge, restart Lasix at a lower dose 20 mg daily, she has outpatient follow-up with her cardiologist next weeks.  We discussed she should contact her cardiologist if she notices increasing lower extremity edema or weight gain greater than 3 pounds over 1 day or 5 pounds daily.  We discussed this is a lower dosage than her prior diuretic dosage      Vital Signs (last 3 days)       Date/Time Temp Pulse Resp BP MAP (mmHg) SpO2 O2 Device Patient Position - Orthostatic VS Alva Coma Scale Score Pain    08/01/24 0830 -- -- -- -- -- 94 % None (Room air) -- 15 No Pain    08/01/24 07:29:06 97.7 °F (36.5 °C) 85 12 113/55 74 94 % -- -- -- --          Weight (last 2 days)       Date/Time Weight    07/30/24 1030 97.5 (215)    07/30/24 00:31:59 97.9 (215.8)    07/30/24 0000 97.9 (215.8)              Pertinent Labs/Diagnostic Results:   Radiology:  US kidney and bladder   Final Interpretation by Jessica Artis MD (07/31 1237)      No hydronephrosis.               Results from last 7 days   Lab Units 08/01/24 0447 07/31/24 0522 07/30/24  0513 07/29/24  2259   WBC Thousand/uL 4.57 4.53 6.05 7.40   HEMOGLOBIN g/dL 7.7* 7.6* 8.5* 9.8*   HEMATOCRIT % 26.4* 25.8* 27.9* 33.0*   PLATELETS Thousands/uL 202 186 210 270   TOTAL NEUT ABS Thousands/µL 1.90 1.85 2.67 4.07         Results from last 7 days   Lab Units 08/01/24 0447 07/31/24 0522 07/30/24  0513 07/29/24  2259   SODIUM mmol/L 142 142 135 132*   POTASSIUM mmol/L 3.8 3.8 3.2* 3.3*   CHLORIDE mmol/L 112* 111* 102 96   CO2 mmol/L 24 23 23 23   ANION GAP mmol/L 6 8 10  13   BUN mg/dL 49* 78* 106* 104*   CREATININE mg/dL 1.23 1.76* 3.15* 3.73*   EGFR ml/min/1.73sq m 45 29 14 11   CALCIUM mg/dL 9.0 8.7 8.8 9.5   MAGNESIUM mg/dL  --  2.2  --   --      Results from last 7 days   Lab Units 08/01/24  1053 08/01/24  0728 07/31/24 2058 07/31/24  1629 07/31/24  1145 07/31/24  0705 07/30/24  2056 07/30/24  1621 07/30/24  1113 07/30/24  0729   POC GLUCOSE mg/dl 153* 121 118 112 113 124 150* 124 139 138     Results from last 7 days   Lab Units 08/01/24  0447 07/31/24  0522 07/30/24  0513 07/29/24  2259   GLUCOSE RANDOM mg/dL 108 114 130 135     Results from last 7 days   Lab Units 07/31/24  0522   FERRITIN ng/mL 10*   IRON SATURATION % 7*   IRON ug/dL 27*   TIBC ug/dL 375     Results from last 7 days   Lab Units 07/30/24  0036   URINE CULTURE  >100,000 cfu/ml Escherichia coli*     Medications:   Scheduled Medications:  atorvastatin, 20 mg, Oral, Daily With Dinner  heparin (porcine), 5,000 Units, Subcutaneous, Q8H ANN  insulin lispro, 1-6 Units, Subcutaneous, TID AC  insulin lispro, 1-6 Units, Subcutaneous, HS  lidocaine, 1 patch, Topical, Daily  pantoprazole, 40 mg, Oral, BID AC  umeclidinium-vilanterol, 1 puff, Inhalation, Daily      Continuous IV Infusions:     PRN Meds:  acetaminophen, 650 mg, Oral, Q6H PRN  albuterol, 2 puff, Inhalation, Q6H PRN  docusate sodium, 100 mg, Oral, BID PRN        Discharge Plan: d    Network Utilization Review Department  ATTENTION: Please call with any questions or concerns to [phone] and carefully listen to the prompts so that you are directed to the right person. All voicemails are confidential.   For Discharge needs, contact Care Management DC Support Team at [phone] opt. 2  Send all requests for admission clinical reviews, approved or denied determinations and any other requests to dedicated fax number below belonging to the campus where the patient is receiving treatment. List of dedicated fax numbers for the Facilities:  FACILITY NAME UR  FAX NUMBER   ADMISSION DENIALS (Administrative/Medical Necessity) [phone]   DISCHARGE SUPPORT TEAM (NETWORK) [phone]   PARENT CHILD HEALTH (Maternity/NICU/Pediatrics) [phone]   [facility] [phone]   [facility] [phone]   [facility] [phone]   [facility] [phone]   [facility] [phone]   [facility] [phone]   [facility] [phone]   [facility] [phone]   [facility] [phone]   [facility] [phone]   [facility] [phone]   [facility] [phone]

## 2024-07-31 NOTE — ASSESSMENT & PLAN NOTE
Hg 9.8  -> 7.6 after IV fluids  Chano snatillan   Recent upper GI bleed 3 weeks ago. Received 5 units PRBC at that time   Continue to monitor and transfuse if less than 7  Follow up iron panel

## 2024-07-31 NOTE — PROGRESS NOTES
NEPHROLOGY PROGRESS NOTE   Zelda Galaviz 68 y.o. female MRN: [id number]  Unit/Bed#: -01 Encounter: [id number]      HPI/24hr EVENTS:    -68-year-old female past medical history of hypertension, HFpEF, morbid obesity, recent upper GI bleed.  Presented with hypotension and lightheadedness.  Nephrology consult for management of TRELL    -Improving renal function    ASSESSMENT/PLAN:    TRELL (POA)  -Baseline creatinine: 0.7 from 7/2022.  Per notes had TRELL during recent hospitalization at outside facility with creatinine peaking at 2.3.  Per daughter creatinine at time of recent discharge was 1.3 and then had a recheck on 7/15 and was 1.3 at that time  -Creatinine on admission 3.7, most recent creatinine 1.76  -Etiology: Prerenal azotemia/hypovolemia, hypotension  -UA: Moderate leukocytes, no protein, no RBCs, 10-20 WBCs, moderate bacteria, 2-4 hyaline casts  -Urine eosinophils 2%, serum eosinophils are 6%  -Received IV fluid resuscitation on admission, currently off all IV fluid now  -Renal ultrasound pending  -Continue holding Lasix, valsartan, HCTZ.  Might need adjustment of diuretic regimen  -Urinary retention protocol     Hypertension/blood pressure  -OP regimen: Lasix 40 mg daily, HCTZ 25 mg daily, valsartan 320 mg daily  -Current regimen: No antihypertensives  -Recent blood pressures are acceptable, had an isolated low reading which I suspect was aberrant     Anemia/history of recent GI bleed  -Hgb 7.6  -Recommend transfuse for goal greater than 7 per primary team  -Per notes had a recent admission at  outside facility for GI bleeding received blood products     HFpEF  -TTE on admission EF 70 to 75%, grade 1 diastolic dysfunction  -Diuretics as above which are currently held  -Per patient she has made changes in her diet and noticed lower extremity edema has massively improved, she appears hypovolemic on exam, she might require adjustment in her diuretic regimen  -She follows closely with cardiology as  outpatient     Additional Medical Problems: Morbid obesity, history of upper GI bleed    SUBJECTIVE:  Reports he feels improved, denies any dizziness/lightheadedness    ROS:  Review of Systems   Constitutional: Negative.    Respiratory: Negative.     Cardiovascular: Negative.    Gastrointestinal: Negative.    Genitourinary: Negative.    Musculoskeletal: Negative.    Neurological: Negative.       A complete 10 point review of systems was performed and found to be negative unless otherwise noted above or in the HPI.    OBJECTIVE:  Current Weight: Weight - Scale: 97.5 kg (215 lb)  Vitals:    07/30/24 1624 07/30/24 2050 07/31/24 0709 07/31/24 0711   BP: (!) 112/48 110/50 (!) 87/48 111/55   BP Location: Right arm  Right arm Left arm   Pulse: 74 64 78 81   Resp:       Temp: 97.5 °F (36.4 °C) 97.7 °F (36.5 °C)  97.6 °F (36.4 °C)   TempSrc: Temporal   Oral   SpO2: 93% 95% 94% 95%   Weight:       Height:           Intake/Output Summary (Last 24 hours) at 7/31/2024 1056  Last data filed at 7/31/2024 0845  Gross per 24 hour   Intake 2608.75 ml   Output 1750 ml   Net 858.75 ml     Physical Exam  Vitals and nursing note reviewed.   Constitutional:       General: She is not in acute distress.     Appearance: She is not toxic-appearing or diaphoretic.      Comments: Awake sitting in bed   HENT:      Head: Normocephalic and atraumatic.      Nose: Nose normal.      Mouth/Throat:      Mouth: Mucous membranes are dry.   Eyes:      General: No scleral icterus.  Cardiovascular:      Rate and Rhythm: Normal rate.      Pulses: Normal pulses.   Pulmonary:      Effort: Pulmonary effort is normal. No respiratory distress.      Breath sounds: No wheezing or rales.   Abdominal:      General: Abdomen is flat.   Musculoskeletal:      Cervical back: Neck supple.      Right lower leg: No edema.      Left lower leg: No edema.   Skin:     General: Skin is warm and dry.      Capillary Refill: Capillary refill takes less than 2 seconds.    Neurological:      Mental Status: She is alert and oriented to person, place, and time.          Medications:    Current Facility-Administered Medications:     acetaminophen (TYLENOL) tablet 650 mg, 650 mg, Oral, Q6H PRN, Cyn Moran PA-C    albuterol (PROVENTIL HFA,VENTOLIN HFA) inhaler 2 puff, 2 puff, Inhalation, Q6H PRN, Cyn Moran PA-C    atorvastatin (LIPITOR) tablet 20 mg, 20 mg, Oral, Daily With Dinner, Cyn Moran PA-C, 20 mg at 07/30/24 1655    docusate sodium (COLACE) capsule 100 mg, 100 mg, Oral, BID PRN, Moris Gilbert PA-C    heparin (porcine) subcutaneous injection 5,000 Units, 5,000 Units, Subcutaneous, Q8H ANN, Cyn Moran PA-C, 5,000 Units at 07/31/24 0520    insulin lispro (HumALOG/ADMELOG) 100 units/mL subcutaneous injection 1-6 Units, 1-6 Units, Subcutaneous, TID AC **AND** Fingerstick Glucose (POCT), , , TID AC, Cyn Moran PA-C    insulin lispro (HumALOG/ADMELOG) 100 units/mL subcutaneous injection 1-6 Units, 1-6 Units, Subcutaneous, HS, Cyn Moran PA-C, 1 Units at 07/30/24 2130    lidocaine (LIDODERM) 5 % patch 1 patch, 1 patch, Topical, Daily, Cyn Moran PA-C, 1 patch at 07/31/24 0800    pantoprazole (PROTONIX) EC tablet 40 mg, 40 mg, Oral, BID AC, Cyn Moran PA-C, 40 mg at 07/31/24 0521    umeclidinium-vilanterol 62.5-25 mcg/actuation inhaler 1 puff, 1 puff, Inhalation, Daily, Cyn Moran PA-C, 1 puff at 07/31/24 0801    Laboratory Results:  Results from last 7 days   Lab Units 07/31/24  0522 07/30/24  0513 07/29/24  2259   WBC Thousand/uL 4.53 6.05 7.40   HEMOGLOBIN g/dL 7.6* 8.5* 9.8*   HEMATOCRIT % 25.8* 27.9* 33.0*   PLATELETS Thousands/uL 186 210 270   POTASSIUM mmol/L 3.8 3.2* 3.3*   CHLORIDE mmol/L 111* 102 96   CO2 mmol/L 23 23 23   BUN mg/dL 78* 106* 104*   CREATININE mg/dL 1.76* 3.15* 3.73*   CALCIUM mg/dL 8.7 8.8 9.5   MAGNESIUM mg/dL 2.2  --   --        I have personally reviewed the blood work as stated above and in my note.  I have personally reviewed internal medicine  note.

## 2024-07-31 NOTE — ASSESSMENT & PLAN NOTE
Upper GI bleed treated at [facility] approximately 3 weeks ago  Per daughter gastric ulcer felt to be due to NSAID use.   No further NSAID use since discharge  Daughter and patient deny any further bleeding since she was discharged  Continue to monitor

## 2024-08-01 VITALS
BODY MASS INDEX: 35.82 KG/M2 | DIASTOLIC BLOOD PRESSURE: 55 MMHG | SYSTOLIC BLOOD PRESSURE: 113 MMHG | WEIGHT: 215 LBS | HEIGHT: 65 IN | HEART RATE: 85 BPM | TEMPERATURE: 97.7 F | OXYGEN SATURATION: 94 % | RESPIRATION RATE: 12 BRPM

## 2024-08-01 LAB
ANION GAP SERPL CALCULATED.3IONS-SCNC: 6 MMOL/L (ref 4–13)
BACTERIA UR CULT: ABNORMAL
BASOPHILS # BLD AUTO: 0.03 THOUSANDS/ÂΜL (ref 0–0.1)
BASOPHILS NFR BLD AUTO: 1 % (ref 0–1)
BUN SERPL-MCNC: 49 MG/DL (ref 5–25)
CALCIUM SERPL-MCNC: 9 MG/DL (ref 8.4–10.2)
CHLORIDE SERPL-SCNC: 112 MMOL/L (ref 96–108)
CO2 SERPL-SCNC: 24 MMOL/L (ref 21–32)
CREAT SERPL-MCNC: 1.23 MG/DL (ref 0.6–1.3)
EOSINOPHIL # BLD AUTO: 0.25 THOUSAND/ÂΜL (ref 0–0.61)
EOSINOPHIL NFR BLD AUTO: 6 % (ref 0–6)
ERYTHROCYTE [DISTWIDTH] IN BLOOD BY AUTOMATED COUNT: 19.5 % (ref 11.6–15.1)
GFR SERPL CREATININE-BSD FRML MDRD: 45 ML/MIN/1.73SQ M
GLUCOSE SERPL-MCNC: 107 MG/DL (ref 65–140)
GLUCOSE SERPL-MCNC: 108 MG/DL (ref 65–140)
GLUCOSE SERPL-MCNC: 121 MG/DL (ref 65–140)
GLUCOSE SERPL-MCNC: 153 MG/DL (ref 65–140)
HCT VFR BLD AUTO: 26.4 % (ref 34.8–46.1)
HGB BLD-MCNC: 7.7 G/DL (ref 11.5–15.4)
IMM GRANULOCYTES # BLD AUTO: 0.02 THOUSAND/UL (ref 0–0.2)
IMM GRANULOCYTES NFR BLD AUTO: 0 % (ref 0–2)
LYMPHOCYTES # BLD AUTO: 1.93 THOUSANDS/ÂΜL (ref 0.6–4.47)
LYMPHOCYTES NFR BLD AUTO: 42 % (ref 14–44)
MCH RBC QN AUTO: 24.1 PG (ref 26.8–34.3)
MCHC RBC AUTO-ENTMCNC: 29.2 G/DL (ref 31.4–37.4)
MCV RBC AUTO: 83 FL (ref 82–98)
MONOCYTES # BLD AUTO: 0.44 THOUSAND/ÂΜL (ref 0.17–1.22)
MONOCYTES NFR BLD AUTO: 10 % (ref 4–12)
NEUTROPHILS # BLD AUTO: 1.9 THOUSANDS/ÂΜL (ref 1.85–7.62)
NEUTS SEG NFR BLD AUTO: 41 % (ref 43–75)
NRBC BLD AUTO-RTO: 0 /100 WBCS
PLATELET # BLD AUTO: 202 THOUSANDS/UL (ref 149–390)
PMV BLD AUTO: 10.7 FL (ref 8.9–12.7)
POTASSIUM SERPL-SCNC: 3.8 MMOL/L (ref 3.5–5.3)
RBC # BLD AUTO: 3.19 MILLION/UL (ref 3.81–5.12)
SODIUM SERPL-SCNC: 142 MMOL/L (ref 135–147)
WBC # BLD AUTO: 4.57 THOUSAND/UL (ref 4.31–10.16)

## 2024-08-01 PROCEDURE — 99239 HOSP IP/OBS DSCHRG MGMT >30: CPT | Performed by: PHYSICIAN ASSISTANT

## 2024-08-01 PROCEDURE — 80048 BASIC METABOLIC PNL TOTAL CA: CPT | Performed by: PHYSICIAN ASSISTANT

## 2024-08-01 PROCEDURE — 99232 SBSQ HOSP IP/OBS MODERATE 35: CPT | Performed by: INTERNAL MEDICINE

## 2024-08-01 PROCEDURE — 82948 REAGENT STRIP/BLOOD GLUCOSE: CPT

## 2024-08-01 PROCEDURE — 85025 COMPLETE CBC W/AUTO DIFF WBC: CPT | Performed by: PHYSICIAN ASSISTANT

## 2024-08-01 RX ORDER — VALSARTAN 320 MG/1
320 TABLET ORAL DAILY
Start: 2024-08-08

## 2024-08-01 RX ORDER — ASCORBIC ACID 500 MG
500 TABLET ORAL DAILY
Qty: 10 TABLET | Refills: 0 | Status: SHIPPED | OUTPATIENT
Start: 2024-08-01 | End: 2024-08-01

## 2024-08-01 RX ORDER — HYDROCHLOROTHIAZIDE 25 MG/1
25 TABLET ORAL DAILY
Start: 2024-08-08

## 2024-08-01 RX ORDER — FERROUS SULFATE 324(65)MG
324 TABLET, DELAYED RELEASE (ENTERIC COATED) ORAL
Qty: 10 TABLET | Refills: 0 | Status: SHIPPED | OUTPATIENT
Start: 2024-08-01 | End: 2024-08-11

## 2024-08-01 RX ORDER — ASCORBIC ACID 500 MG
500 TABLET ORAL DAILY
Qty: 10 TABLET | Refills: 0 | Status: SHIPPED | OUTPATIENT
Start: 2024-08-01 | End: 2024-08-11

## 2024-08-01 RX ORDER — FUROSEMIDE 40 MG/1
20 TABLET ORAL DAILY
Start: 2024-08-01

## 2024-08-01 RX ORDER — FERROUS SULFATE 324(65)MG
324 TABLET, DELAYED RELEASE (ENTERIC COATED) ORAL
Qty: 10 TABLET | Refills: 0 | Status: SHIPPED | OUTPATIENT
Start: 2024-08-01 | End: 2024-08-01

## 2024-08-01 RX ADMIN — LIDOCAINE 5% 1 PATCH: 700 PATCH TOPICAL at 09:08

## 2024-08-01 RX ADMIN — PANTOPRAZOLE SODIUM 40 MG: 40 TABLET, DELAYED RELEASE ORAL at 05:51

## 2024-08-01 RX ADMIN — HEPARIN SODIUM 5000 UNITS: 5000 INJECTION, SOLUTION INTRAVENOUS; SUBCUTANEOUS at 05:51

## 2024-08-01 RX ADMIN — PANTOPRAZOLE SODIUM 40 MG: 40 TABLET, DELAYED RELEASE ORAL at 16:01

## 2024-08-01 RX ADMIN — HEPARIN SODIUM 5000 UNITS: 5000 INJECTION, SOLUTION INTRAVENOUS; SUBCUTANEOUS at 16:01

## 2024-08-01 RX ADMIN — ATORVASTATIN CALCIUM 20 MG: 20 TABLET, FILM COATED ORAL at 16:01

## 2024-08-01 RX ADMIN — IRON SUCROSE 200 MG: 20 INJECTION, SOLUTION INTRAVENOUS at 09:08

## 2024-08-01 RX ADMIN — UMECLIDINIUM BROMIDE AND VILANTEROL TRIFENATATE 1 PUFF: 62.5; 25 POWDER RESPIRATORY (INHALATION) at 09:05

## 2024-08-01 RX ADMIN — INSULIN LISPRO 1 UNITS: 100 INJECTION, SOLUTION INTRAVENOUS; SUBCUTANEOUS at 13:42

## 2024-08-01 NOTE — PROGRESS NOTES
NEPHROLOGY PROGRESS NOTE   Zelda Galaviz 68 y.o. female MRN: [id number]  Unit/Bed#: -01 Encounter: [id number]      HPI/24hr EVENTS:    -68-year-old female past medical history of hypertension, HFpEF, morbid obesity, recent upper GI bleed.  Presented with hypotension and lightheadedness.  Nephrology consult for management of TRELL     -Improving renal function    ASSESSMENT/PLAN:    TRELL (POA)  -Baseline creatinine: 0.7 from 7/2022.  Per notes had TRELL during recent hospitalization at outside facility with creatinine peaking at 2.3.  Per daughter creatinine at time of recent discharge was 1.3 and then had a recheck on 7/15 and was 1.3 at that time  -Creatinine on admission 3.7, most recent creatinine 1.23  -Etiology: Prerenal azotemia/hypovolemia, hypotension  -UA: Moderate leukocytes, no protein, no RBCs, 10-20 WBCs, moderate bacteria, 2-4 hyaline casts  -Urine eosinophils 2%, serum eosinophils are 6%  -Received IV fluid resuscitation on admission, currently off all IV fluid now  -Renal ultrasound with unremarkable echogenicity and contour bilaterally, no hydronephrosis bilaterally, bilateral simple renal cysts  -Lasix, HCTZ, valsartan were held on admission  -Urinary retention protocol  -Hold HCTZ and valsartan at time of discharge, restart Lasix at a lower dose 20 mg daily, she has outpatient follow-up with her cardiologist next weeks.  We discussed she should contact her cardiologist if she notices increasing lower extremity edema or weight gain greater than 3 pounds over 1 day or 5 pounds daily.  We discussed this is a lower dosage than her prior diuretic dosage     Hypertension/blood pressure  -OP regimen: Lasix 40 mg daily, HCTZ 25 mg daily, valsartan 320 mg daily  -Current regimen: No antihypertensives  -Recent blood pressures are acceptable, restart Lasix 20 mg daily, hold HCTZ and valsartan on discharge, these can be restarted if patient's blood pressure is elevated as outpatient     Anemia/history of  recent GI bleed  -Hgb 7.7  -Recommend transfuse for goal greater than 7 per primary team  -Per notes had a recent admission at  outside facility for GI bleeding received blood products     HFpEF  -TTE on admission EF 70 to 75%, grade 1 diastolic dysfunction  -Diuretics as above which are currently held  -Per patient she has made changes in her diet and noticed lower extremity edema has massively improved, she appears hypovolemic on exam, she might require adjustment in her diuretic regimen  -She follows closely with cardiology as outpatient, restart Lasix as above     Additional Medical Problems: Morbid obesity, history of upper GI bleed    Discussed with slim AP    SUBJECTIVE:  Reports she feels well, denies any nausea/vomiting/diarrhea, reports she is tolerating her appetite    ROS:  Review of Systems   Constitutional: Negative.    Respiratory: Negative.     Cardiovascular: Negative.    Gastrointestinal: Negative.    Genitourinary: Negative.    Musculoskeletal: Negative.    Neurological: Negative.       A complete 10 point review of systems was performed and found to be negative unless otherwise noted above or in the HPI.    OBJECTIVE:  Current Weight: Weight - Scale: 97.5 kg (215 lb)  Vitals:    07/31/24 0711 07/31/24 1515 07/31/24 2006 08/01/24 0729   BP: 111/55 100/52 120/54 113/55   BP Location: Left arm Left arm Right arm    Pulse: 81 87 62 85   Resp:  17 16 12   Temp: 97.6 °F (36.4 °C) 97.7 °F (36.5 °C) 97.7 °F (36.5 °C) 97.7 °F (36.5 °C)   TempSrc: Oral Oral Temporal    SpO2: 95%  95% 94%   Weight:       Height:           Intake/Output Summary (Last 24 hours) at 8/1/2024 0941  Last data filed at 8/1/2024 0916  Gross per 24 hour   Intake 840 ml   Output 400 ml   Net 440 ml     Physical Exam  Vitals and nursing note reviewed.   Constitutional:       General: She is not in acute distress.     Appearance: She is obese. She is not toxic-appearing or diaphoretic.      Comments: Awake sitting at the edge of bed    HENT:      Head: Normocephalic and atraumatic.      Nose: Nose normal.      Mouth/Throat:      Mouth: Mucous membranes are dry.   Eyes:      General: No scleral icterus.  Cardiovascular:      Rate and Rhythm: Normal rate.      Pulses: Normal pulses.   Pulmonary:      Effort: Pulmonary effort is normal. No respiratory distress.      Breath sounds: No wheezing or rales.   Abdominal:      General: Abdomen is flat.   Musculoskeletal:      Cervical back: Neck supple.      Comments: Trace left lower extremity edema   Skin:     General: Skin is warm and dry.      Capillary Refill: Capillary refill takes less than 2 seconds.   Neurological:      Mental Status: She is alert and oriented to person, place, and time.          Medications:    Current Facility-Administered Medications:     acetaminophen (TYLENOL) tablet 650 mg, 650 mg, Oral, Q6H PRN, Cyn Moran PA-C    albuterol (PROVENTIL HFA,VENTOLIN HFA) inhaler 2 puff, 2 puff, Inhalation, Q6H PRN, Cyn Moran PA-C    atorvastatin (LIPITOR) tablet 20 mg, 20 mg, Oral, Daily With Dinner, Cyn Moran PA-C, 20 mg at 07/31/24 1630    docusate sodium (COLACE) capsule 100 mg, 100 mg, Oral, BID PRN, Moris Gilbert PA-C    heparin (porcine) subcutaneous injection 5,000 Units, 5,000 Units, Subcutaneous, Q8H ANN, Cyn Moran PA-C, 5,000 Units at 08/01/24 0551    insulin lispro (HumALOG/ADMELOG) 100 units/mL subcutaneous injection 1-6 Units, 1-6 Units, Subcutaneous, TID AC **AND** Fingerstick Glucose (POCT), , , TID AC, Cyn Moran PA-C    insulin lispro (HumALOG/ADMELOG) 100 units/mL subcutaneous injection 1-6 Units, 1-6 Units, Subcutaneous, HS, Cyn Moran PA-C, 1 Units at 07/30/24 2130    iron sucrose (VENOFER) 200 mg in sodium chloride 0.9 % 100 mL IVPB, 200 mg, Intravenous, Daily, Moris Gilbert PA-C, Last Rate: 100 mL/hr at 08/01/24 0908, 200 mg at 08/01/24 0908    lidocaine (LIDODERM) 5 % patch 1 patch, 1 patch, Topical, Daily, Cyn Moran PA-C, 1 patch at 08/01/24 0908     pantoprazole (PROTONIX) EC tablet 40 mg, 40 mg, Oral, BID AC, Cyn Moran PA-C, 40 mg at 08/01/24 0551    umeclidinium-vilanterol 62.5-25 mcg/actuation inhaler 1 puff, 1 puff, Inhalation, Daily, Cyn Moran PA-C, 1 puff at 08/01/24 0905    Laboratory Results:  Results from last 7 days   Lab Units 08/01/24  0447 07/31/24  0522 07/30/24  0513 07/29/24  2259   WBC Thousand/uL 4.57 4.53 6.05 7.40   HEMOGLOBIN g/dL 7.7* 7.6* 8.5* 9.8*   HEMATOCRIT % 26.4* 25.8* 27.9* 33.0*   PLATELETS Thousands/uL 202 186 210 270   POTASSIUM mmol/L 3.8 3.8 3.2* 3.3*   CHLORIDE mmol/L 112* 111* 102 96   CO2 mmol/L 24 23 23 23   BUN mg/dL 49* 78* 106* 104*   CREATININE mg/dL 1.23 1.76* 3.15* 3.73*   CALCIUM mg/dL 9.0 8.7 8.8 9.5   MAGNESIUM mg/dL  --  2.2  --   --        I have personally reviewed the blood work as stated above and in my note.  I have personally reviewed renal ultrasound imaging studies.  I have personally reviewed internal medicine note.

## 2024-08-01 NOTE — ASSESSMENT & PLAN NOTE
Wt Readings from Last 3 Encounters:   07/30/24 97.5 kg (215 lb)     Appears hypovolemic on exam  Per daughter bilateral lower extremity swelling has improved significantly over the past 3 weeks with patient's dietary changes.  Continues to eat 3 meals per day but has cut out lots of sodium and high sugary foods  Hold home Lasix and HCTZ as above  I/O and daily weights

## 2024-08-01 NOTE — ASSESSMENT & PLAN NOTE
Lab Results   Component Value Date    HGBA1C 7.5 (H) 07/29/2024       Recent Labs     07/31/24  1145 07/31/24  1629 07/31/24 2058 08/01/24  0728   POCGLU 113 112 118 121         Blood Sugar Average: Last 72 hrs:  (P) 126.8075710184245346  Diagnosed with dm about 3 weeks ago during hospitalization. A1c was 11.6. Daughter was informed that it may not be accurate due to her receiving multiple blood transfusions. However they have made large changes to her diet. BS checked multiple times per day. Range 100-180s.   Home regimen:   Metformin 500 mg bid (just started on 7/29)  A1c is 7.5  Continue dietary and exercise modifications

## 2024-08-01 NOTE — DISCHARGE SUMMARY
Critical access hospital  Discharge- Zelda Galaviz 1955, 68 y.o. female MRN: [id number]  Unit/Bed#: MS [facility]-01 Encounter: [id number]  Primary Care Provider: No primary care provider on file.   Date and time admitted to hospital: 7/29/2024 10:29 PM    * TRELL (acute kidney injury) (HCC)  Assessment & Plan  Presents from home with daughter due to feeling lightheaded and hypotension per home readings  Recent admission at [facility] (3 weeks ago) due to upper GI bleed/Gastric ulcer.  Bands placed per daughter.  Received approximately 5 units PRBC  Admit Creatinine 3.73 (1.3 on 7/15)  2.36 was peak at [facility]   Renal US WNL  Urinary retention protocol   Hold home valsartan and HCTZ  Resume Lasix at 20 mg daily tomorrow  Cr better at 1.2 today  Status post IV fluids  Nephrology consult appreciated  Repeat BMP in 1 week    Anemia  Assessment & Plan  Hg 9.8  -> 7.6 after IV fluids  Likley dilution   Recent upper GI bleed 3 weeks ago. Received 5 units PRBC at that time   Iron panel low, received 2 unit doses of IV Venofer in hospital  Discharge with p.o. iron and repeat CBC next week    H/O: upper GI bleed  Assessment & Plan  Upper GI bleed treated at [facility] approximately 3 weeks ago  Per daughter gastric ulcer felt to be due to NSAID use.   No further NSAID use since discharge  Daughter and patient deny any further bleeding since she was discharged  Continue to monitor    Chronic diastolic congestive heart failure (HCC)  Assessment & Plan  Wt Readings from Last 3 Encounters:   07/30/24 97.5 kg (215 lb)     Appears hypovolemic on exam  Per daughter bilateral lower extremity swelling has improved significantly over the past 3 weeks with patient's dietary changes.  Continues to eat 3 meals per day but has cut out lots of sodium and high sugary foods  Hold home Lasix and HCTZ as above  I/O and daily weights    Obesity (BMI 30-39.9)  Assessment & Plan  Body mass index is 35.78  kg/m².  Encourage lifestyle changes    Essential hypertension  Assessment & Plan  Home regimen:   Valsartan 320 mg daily  HCTZ 25 mg daily  Lasix 40 mg daily  Per daughter BP low at home.  She held BP medications over the past 2 days.  Normotensive here thus far.    On discharge hold valsartan and HCTZ until family doctor appointment 8/8.  Resume Lasix at 20 mg daily.    Type 2 diabetes mellitus (HCC)  Assessment & Plan  Lab Results   Component Value Date    HGBA1C 7.5 (H) 07/29/2024       Recent Labs     07/31/24  1145 07/31/24  1629 07/31/24  2058 08/01/24  0728   POCGLU 113 112 118 121         Blood Sugar Average: Last 72 hrs:  (P) [id number]  Diagnosed with dm about 3 weeks ago during hospitalization. A1c was 11.6. Daughter was informed that it may not be accurate due to her receiving multiple blood transfusions. However they have made large changes to her diet. BS checked multiple times per day. Range 100-180s.   Home regimen:   Metformin 500 mg bid (just started on 7/29)  A1c is 7.5  Continue dietary and exercise modifications      Medical Problems       Resolved Problems  Date Reviewed: 8/1/2024   None       Discharging Physician / Practitioner: Moris Gilbert PA-C  PCP: No primary care provider on file.  Admission Date:   Admission Orders (From admission, onward)       Ordered        07/30/24 0008  INPATIENT ADMISSION  Once                          Discharge Date: 08/01/24    Consultations During Hospital Stay:  Nephrology     Procedures Performed:   None     Significant Findings / Test Results:   Admission creatinine 3.7, on discharge 1.2  Iron panel with low iron  Renal ultrasound no hydronephrosis or obstruction    Incidental Findings:   none     Test Results Pending at Discharge (will require follow up):   none     Outpatient Tests Requested:  CBC and BMP 1 week    Complications:  none    Reason for Admission: TRELL    Hospital Course:   Zelda Galaviz is a 68 y.o. female patient who  "originally presented to the hospital on 7/29/2024 due to fatigue and weakness.  Was found to have TRELL with creatinine 3.7 upon admission.  Nephrotoxic medications were held and patient was started on IV fluids.  With IV fluids patient clinically improved as she was hypovolemic upon exam.  Hemoglobin was diluted but overall stable.  Found to have iron deficiency anemia.  2 units of IV iron were given to the patient and she was started on p.o. iron supplementation.  Creatinine back to baseline and she feels well.  Recommend resuming Lasix at 20 mg daily and holding valsartan hand as well as HCTZ.  She will be discharged with family doctor appointment in 1 week as well as appoint with cardiologist in 1 week.    Please see above list of diagnoses and related plan for additional information.     Condition at Discharge: stable    Discharge Day Visit / Exam:   Subjective: Patient feels well, back to baseline  Vitals: Blood Pressure: 113/55 (08/01/24 0729)  Pulse: 85 (08/01/24 0729)  Temperature: 97.7 °F (36.5 °C) (08/01/24 0729)  Temp Source: Temporal (07/31/24 2006)  Respirations: 12 (08/01/24 0729)  Height: 5' 5\" (165.1 cm) (07/30/24 1030)  Weight - Scale: 97.5 kg (215 lb) (07/30/24 1030)  SpO2: 94 % (08/01/24 0729)  Exam:   Physical Exam  Vitals and nursing note reviewed.   Constitutional:       General: She is not in acute distress.     Appearance: Normal appearance.   HENT:      Head: Normocephalic.      Mouth/Throat:      Mouth: Mucous membranes are moist.   Eyes:      General: No scleral icterus.     Pupils: Pupils are equal, round, and reactive to light.   Cardiovascular:      Rate and Rhythm: Normal rate and regular rhythm.      Heart sounds: No murmur heard.  Pulmonary:      Effort: Pulmonary effort is normal. No respiratory distress.      Breath sounds: Normal breath sounds. No wheezing, rhonchi or rales.   Abdominal:      General: Bowel sounds are normal. There is no distension.      Palpations: Abdomen is " soft.      Tenderness: There is no abdominal tenderness.   Musculoskeletal:         General: No swelling.      Right lower leg: No edema.      Left lower leg: No edema.   Skin:     Capillary Refill: Capillary refill takes less than 2 seconds.   Neurological:      General: No focal deficit present.      Mental Status: She is alert and oriented to person, place, and time. Mental status is at baseline.          Discussion with Family: Updated  (daughter) via phone.    Discharge instructions/Information to patient and family:   See after visit summary for information provided to patient and family.      Provisions for Follow-Up Care:  See after visit summary for information related to follow-up care and any pertinent home health orders.       Disposition:   Home    Planned Readmission: no     Discharge Statement:  I spent 45 minutes discharging the patient. This time was spent on the day of discharge. I had direct contact with the patient on the day of discharge. Greater than 50% of the total time was spent examining patient, answering all patient questions, arranging and discussing plan of care with patient as well as directly providing post-discharge instructions.  Additional time then spent on discharge activities.    Discharge Medications:  See after visit summary for reconciled discharge medications provided to patient and/or family.      **Please Note: This note may have been constructed using a voice recognition system**

## 2024-08-01 NOTE — ASSESSMENT & PLAN NOTE
Home regimen:   Valsartan 320 mg daily  HCTZ 25 mg daily  Lasix 40 mg daily  Per daughter BP low at home.  She held BP medications over the past 2 days.  Normotensive here thus far.    On discharge hold valsartan and HCTZ until family doctor appointment 8/8.  Resume Lasix at 20 mg daily.

## 2024-08-01 NOTE — ASSESSMENT & PLAN NOTE
Upper GI bleed treated at [facility] approximately 3 weeks ago  Per daughter gastric ulcer felt to be due to NSAID use.   No further NSAID use since discharge  Daughter and patient deny any further bleeding since she was discharged  Continue to monitor

## 2024-08-01 NOTE — PLAN OF CARE
Problem: PAIN - ADULT  Goal: Verbalizes/displays adequate comfort level or baseline comfort level  Description: Interventions:  - Encourage patient to monitor pain and request assistance  - Assess pain using appropriate pain scale  - Administer analgesics based on type and severity of pain and evaluate response  - Implement non-pharmacological measures as appropriate and evaluate response  - Consider cultural and social influences on pain and pain management  - Notify physician/advanced practitioner if interventions unsuccessful or patient reports new pain  Outcome: Progressing     Problem: INFECTION - ADULT  Goal: Absence or prevention of progression during hospitalization  Description: INTERVENTIONS:  - Assess and monitor for signs and symptoms of infection  - Monitor lab/diagnostic results  - Monitor all insertion sites, i.e. indwelling lines, tubes, and drains  - Monitor endotracheal if appropriate and nasal secretions for changes in amount and color  - [place] appropriate cooling/warming therapies per order  - Administer medications as ordered  - Instruct and encourage patient and family to use good hand hygiene technique  - Identify and instruct in appropriate isolation precautions for identified infection/condition  Outcome: Progressing  Goal: Absence of fever/infection during neutropenic period  Description: INTERVENTIONS:  - Monitor WBC    Outcome: Progressing     Problem: SAFETY ADULT  Goal: Maintain or return to baseline ADL function  Description: INTERVENTIONS:  -  Assess patient's ability to carry out ADLs; assess patient's baseline for ADL function and identify physical deficits which impact ability to perform ADLs (bathing, care of mouth/teeth, toileting, grooming, dressing, etc.)  - Assess/evaluate cause of self-care deficits   - Assess range of motion  - Assess patient's mobility; develop plan if impaired  - Assess patient's need for assistive devices and provide as appropriate  - Encourage  maximum independence but intervene and supervise when necessary  - Involve family in performance of ADLs  - Assess for home care needs following discharge   - Consider OT consult to assist with ADL evaluation and planning for discharge  - Provide patient education as appropriate  Outcome: Progressing     Problem: DISCHARGE PLANNING  Goal: Discharge to home or other facility with appropriate resources  Description: INTERVENTIONS:  - Identify barriers to discharge w/patient and caregiver  - Arrange for needed discharge resources and transportation as appropriate  - Identify discharge learning needs (meds, wound care, etc.)  - Arrange for interpretive services to assist at discharge as needed  - Refer to Case Management Department for coordinating discharge planning if the patient needs post-hospital services based on physician/advanced practitioner order or complex needs related to functional status, cognitive ability, or social support system  Outcome: Progressing     Problem: Knowledge Deficit  Goal: Patient/family/caregiver demonstrates understanding of disease process, treatment plan, medications, and discharge instructions  Description: Complete learning assessment and assess knowledge base.  Interventions:  - Provide teaching at level of understanding  - Provide teaching via preferred learning methods  Outcome: Progressing     Problem: METABOLIC, FLUID AND ELECTROLYTES - ADULT  Goal: Electrolytes maintained within normal limits  Description: INTERVENTIONS:  - Monitor labs and assess patient for signs and symptoms of electrolyte imbalances  - Administer electrolyte replacement as ordered  - Monitor response to electrolyte replacements, including repeat lab results as appropriate  - Instruct patient on fluid and nutrition as appropriate  Outcome: Progressing  Goal: Fluid balance maintained  Description: INTERVENTIONS:  - Monitor labs   - Monitor I/O and WT  - Instruct patient on fluid and nutrition as  appropriate  - Assess for signs & symptoms of volume excess or deficit  Outcome: Progressing     Problem: Potential for Falls  Goal: Patient will remain free of falls  Description: INTERVENTIONS:  - Educate patient/family on patient safety including physical limitations  - Instruct patient to call for assistance with activity   - Consult OT/PT to assist with strengthening/mobility   - Keep Call bell within reach  - Keep bed low and locked with side rails adjusted as appropriate  - Keep care items and personal belongings within reach  - Initiate and maintain comfort rounds  - Make Fall Risk Sign visible to staff  - Offer Toileting every 2 Hours, in advance of need  - Initiate/Maintain alarm  - Obtain necessary fall risk management equipment  - Apply yellow socks and bracelet for high fall risk patients  - Consider moving patient to room near nurses station  Outcome: Progressing

## 2024-08-02 NOTE — UTILIZATION REVIEW
NOTIFICATION OF ADMISSION DISCHARGE   This is a Notification of Discharge from [facility]. Please be advised that this patient has been discharge from our facility. Below you will find the admission and discharge date and time including the patient’s disposition.   UTILIZATION REVIEW CONTACT:  Arlene Womack  Utilization   Network Utilization Review Department  Phone: [phone] x carefully listen to the prompts. All voicemails are confidential.  Email: [email or web address]     ADMISSION INFORMATION  PRESENTATION DATE: 7/29/2024 10:29 PM  OBERVATION ADMISSION DATE: N/A  INPATIENT ADMISSION DATE: 7/30/24 12:08 AM   DISCHARGE DATE: 8/1/2024  7:14 PM   DISPOSITION:Home/Self Care    Network Utilization Review Department  ATTENTION: Please call with any questions or concerns to [phone] and carefully listen to the prompts so that you are directed to the right person. All voicemails are confidential.   For Discharge needs, contact Care Management DC Support Team at [phone] opt. 2  Send all requests for admission clinical reviews, approved or denied determinations and any other requests to dedicated fax number below belonging to the campus where the patient is receiving treatment. List of dedicated fax numbers for the Facilities:  FACILITY NAME UR FAX NUMBER   ADMISSION DENIALS (Administrative/Medical Necessity) [phone]   DISCHARGE SUPPORT TEAM ([facility]) [phone]   PARENT CHILD HEALTH (Maternity/NICU/Pediatrics) [phone]   [facility] [phone]   [facility] [phone]   [facility] [phone]   [facility] [phone]   [facility] [phone]   [facility] [phone]   [facility] [phone]   [facility] [phone]    [facility] [phone]   [facility] [phone]   [facility] [phone]   [facility] [phone]